# Patient Record
Sex: FEMALE | Race: WHITE | NOT HISPANIC OR LATINO | Employment: OTHER | URBAN - METROPOLITAN AREA
[De-identification: names, ages, dates, MRNs, and addresses within clinical notes are randomized per-mention and may not be internally consistent; named-entity substitution may affect disease eponyms.]

---

## 2017-06-12 ENCOUNTER — CONVERSION ENCOUNTER (OUTPATIENT)
Dept: MAMMOGRAPHY | Facility: CLINIC | Age: 82
End: 2017-06-12

## 2018-09-11 DIAGNOSIS — E03.9 HYPOTHYROIDISM, UNSPECIFIED TYPE: Primary | ICD-10-CM

## 2018-09-11 RX ORDER — LEVOTHYROXINE SODIUM 0.05 MG/1
TABLET ORAL
COMMUNITY
Start: 2017-08-14 | End: 2018-09-11 | Stop reason: SDUPTHER

## 2018-09-11 RX ORDER — LEVOTHYROXINE SODIUM 0.05 MG/1
50 TABLET ORAL DAILY
Qty: 90 TABLET | Refills: 2 | Status: SHIPPED | OUTPATIENT
Start: 2018-09-11 | End: 2018-12-03 | Stop reason: SDUPTHER

## 2018-09-11 RX ORDER — CLOPIDOGREL BISULFATE 75 MG/1
TABLET ORAL EVERY 24 HOURS
COMMUNITY
Start: 2017-09-11 | End: 2018-12-03 | Stop reason: SDUPTHER

## 2018-09-11 RX ORDER — RANITIDINE 150 MG/1
TABLET ORAL EVERY 24 HOURS
COMMUNITY
Start: 2017-08-14 | End: 2018-12-03 | Stop reason: SDUPTHER

## 2018-09-11 RX ORDER — DILTIAZEM HYDROCHLORIDE 240 MG/1
CAPSULE, COATED, EXTENDED RELEASE ORAL
COMMUNITY
Start: 2017-08-14 | End: 2018-12-03 | Stop reason: SDUPTHER

## 2018-11-19 DIAGNOSIS — N18.30 CKD (CHRONIC KIDNEY DISEASE), STAGE III (HCC): ICD-10-CM

## 2018-11-19 DIAGNOSIS — R80.9 PROTEINURIA, UNSPECIFIED TYPE: Primary | ICD-10-CM

## 2018-11-30 ENCOUNTER — TELEPHONE (OUTPATIENT)
Dept: FAMILY MEDICINE CLINIC | Facility: CLINIC | Age: 83
End: 2018-11-30

## 2018-11-30 RX ORDER — LOSARTAN POTASSIUM 50 MG/1
TABLET ORAL EVERY 24 HOURS
COMMUNITY
Start: 2017-11-29 | End: 2018-12-03 | Stop reason: SDUPTHER

## 2018-11-30 RX ORDER — ISOSORBIDE MONONITRATE 30 MG/1
TABLET, EXTENDED RELEASE ORAL
COMMUNITY
Start: 2017-08-18 | End: 2018-12-03 | Stop reason: SDUPTHER

## 2018-11-30 RX ORDER — CHOLECALCIFEROL (VITAMIN D3) 125 MCG
CAPSULE ORAL
COMMUNITY
Start: 2014-04-17 | End: 2018-12-03 | Stop reason: SDUPTHER

## 2018-12-03 ENCOUNTER — OFFICE VISIT (OUTPATIENT)
Dept: FAMILY MEDICINE CLINIC | Facility: CLINIC | Age: 83
End: 2018-12-03
Payer: MEDICARE

## 2018-12-03 VITALS
OXYGEN SATURATION: 98 % | SYSTOLIC BLOOD PRESSURE: 128 MMHG | HEART RATE: 53 BPM | DIASTOLIC BLOOD PRESSURE: 80 MMHG | TEMPERATURE: 95.2 F | WEIGHT: 142 LBS | RESPIRATION RATE: 15 BRPM

## 2018-12-03 DIAGNOSIS — K21.9 GASTROESOPHAGEAL REFLUX DISEASE WITHOUT ESOPHAGITIS: Primary | ICD-10-CM

## 2018-12-03 DIAGNOSIS — E55.9 VITAMIN D DEFICIENCY: ICD-10-CM

## 2018-12-03 DIAGNOSIS — I10 ESSENTIAL HYPERTENSION: ICD-10-CM

## 2018-12-03 DIAGNOSIS — E03.9 HYPOTHYROIDISM, UNSPECIFIED TYPE: ICD-10-CM

## 2018-12-03 DIAGNOSIS — I25.10 CORONARY ARTERY DISEASE INVOLVING NATIVE CORONARY ARTERY OF NATIVE HEART WITHOUT ANGINA PECTORIS: ICD-10-CM

## 2018-12-03 DIAGNOSIS — Z12.11 SCREEN FOR COLON CANCER: ICD-10-CM

## 2018-12-03 PROCEDURE — 99214 OFFICE O/P EST MOD 30 MIN: CPT | Performed by: SPECIALIST

## 2018-12-03 RX ORDER — LEVOTHYROXINE SODIUM 0.05 MG/1
50 TABLET ORAL DAILY
Qty: 90 TABLET | Refills: 4 | Status: SHIPPED | OUTPATIENT
Start: 2018-12-03 | End: 2019-06-19 | Stop reason: SDUPTHER

## 2018-12-03 RX ORDER — DILTIAZEM HYDROCHLORIDE 240 MG/1
240 CAPSULE, COATED, EXTENDED RELEASE ORAL DAILY
Qty: 90 CAPSULE | Refills: 4 | Status: SHIPPED | OUTPATIENT
Start: 2018-12-03 | End: 2019-06-19 | Stop reason: SDUPTHER

## 2018-12-03 RX ORDER — LOSARTAN POTASSIUM 50 MG/1
50 TABLET ORAL EVERY 24 HOURS
Qty: 90 TABLET | Refills: 4 | Status: SHIPPED | OUTPATIENT
Start: 2018-12-03 | End: 2019-06-19 | Stop reason: SDUPTHER

## 2018-12-03 RX ORDER — ISOSORBIDE MONONITRATE 30 MG/1
30 TABLET, EXTENDED RELEASE ORAL DAILY
Qty: 90 TABLET | Refills: 4 | Status: SHIPPED | OUTPATIENT
Start: 2018-12-03 | End: 2019-06-19 | Stop reason: SDUPTHER

## 2018-12-03 RX ORDER — CHOLECALCIFEROL (VITAMIN D3) 125 MCG
2000 CAPSULE ORAL DAILY
Qty: 90 TABLET | Refills: 4 | Status: SHIPPED | OUTPATIENT
Start: 2018-12-03 | End: 2020-08-19 | Stop reason: SDUPTHER

## 2018-12-03 RX ORDER — RANITIDINE 150 MG/1
150 TABLET ORAL DAILY PRN
Qty: 90 TABLET | Refills: 4 | Status: SHIPPED | OUTPATIENT
Start: 2018-12-03 | End: 2019-06-19 | Stop reason: SDUPTHER

## 2018-12-03 RX ORDER — CLOPIDOGREL BISULFATE 75 MG/1
75 TABLET ORAL DAILY
Qty: 90 TABLET | Refills: 4 | Status: SHIPPED | OUTPATIENT
Start: 2018-12-03 | End: 2019-06-19 | Stop reason: SDUPTHER

## 2018-12-03 NOTE — PROGRESS NOTES
Assessment and Plan:  Problem List Items Addressed This Visit     None        Health Maintenance Due   Topic Date Due    Depression Screening PHQ  03/31/1928    DTaP,Tdap,and Td Vaccines (1 - Tdap) 03/31/1949    Fall Risk  03/31/1993    Urinary Incontinence Screening  03/31/1993    Pneumococcal PPSV23/PCV13 65+ Years / Low and Medium Risk (1 of 2 - PCV13) 03/31/1993    INFLUENZA VACCINE  07/01/2018         HPI:  There is no problem list on file for this patient  Past Medical History:   Diagnosis Date    Appendicitis     CAD (coronary artery disease)     Colon polyps     Diverticulosis      Past Surgical History:   Procedure Laterality Date    APPENDECTOMY      CARDIAC CATHETERIZATION  03/2009    CATARACT EXTRACTION      COLONOSCOPY W/ POLYPECTOMY  10/2012    HYSTERECTOMY      TONSILLECTOMY       History reviewed  No pertinent family history  History   Smoking Status    Never Smoker   Smokeless Tobacco    Never Used     History   Alcohol Use    Yes     Comment: Occasional      History   Drug Use No         Current Outpatient Prescriptions   Medication Sig Dispense Refill    aspirin (ASPIRIN LOW DOSE) 81 MG tablet every 24 hours      Cholecalciferol (VITAMIN D3) 2000 units TABS take 1 Capsule by Oral route once      clopidogrel (PLAVIX) 75 mg tablet every 24 hours      diltiazem (CARTIA XT) 240 mg 24 hr capsule take 1 capsule by oral route 2 times every day   isosorbide mononitrate (IMDUR) 30 mg 24 hr tablet TAKE 1 TABLET (30MG) BY ORAL ROUTE EVERY DAY IN THE MORNING      levothyroxine 50 mcg tablet Take 1 tablet (50 mcg total) by mouth daily 90 tablet 2    losartan (COZAAR) 50 mg tablet every 24 hours      ranitidine (ZANTAC) 150 mg tablet every 24 hours       No current facility-administered medications for this visit        Allergies   Allergen Reactions    Amlodipine     Carvedilol     Digoxin     Losartan Edema    Nifedipine      Other reaction(s): Nausea and/or vomiting    Olmesartan     Other      Other reaction(s): Nausea and/or vomiting  CALCIUM CHANNEL BLOCKING AGENT    Rosuvastatin      Other reaction(s): Other (See Comments)  nausea    Sulfa Antibiotics        There is no immunization history on file for this patient  Patient Care Team:  Rowan Márquez MD as PCP - General (Internal Medicine)    Medicare Screening Tests and Risk Assessments:  Noelle Vivas is here for her Initial Wellness visit  Health Risk Assessment:  Patient rates overall health as excellent  Patient feels that their physical health rating is Same  Eyesight was rated as Same  Hearing was rated as Same  Patient feels that their emotional and mental health rating is Same  Pain experienced by patient in the last 7 days has been None  Patient states that she has experienced no weight loss or gain in last 6 months  Emotional/Mental Health:  Patient has been feeling nervous/anxious  PHQ-9 Depression Screening:    Frequency of the following problems over the past two weeks:      1  Little interest or pleasure in doing things: 0 - not at all      2  Feeling down, depressed, or hopeless: 0 - not at all  PHQ-2 Score: 0          Broken Bones/Falls: Fall Risk Assessment:    In the past year, patient has experienced: History of falling in past year     Number of falls: 1  Patient does not feel she is unsteady standing  Patient is not taking medication that can cause feelings of lightheadedness or tiredness  Patient often has a need to rush to the toilet  Chronic conditions that may contribute to falls visual disturbance  Bladder/Bowel:  Patient has leaked urine accidently in the last six months  Patient reports no loss of bowel control  Immunizations:  Patient has not had a flu vaccination within the last year  Patient has received a pneumonia shot  Patient has not received a shingles shot  Patient has not received tetanus/diphtheria shot       Home Safety:  Patient does not have trouble with stairs inside or outside of their home  Patient currently reports that there are safety hazards present in home , working smoke alarms, working carbon monoxide detectors  Preventative Screenings:   No breast cancer screening performed, no colon cancer screen completed, cholesterol screen completed, glaucoma eye exam completed,     Nutrition:  Current diet: Regular, Low Cholesterol, Low Saturated Fat, Low Carb and No Added Salt with servings of the following:    Medications:  Patient is currently taking over-the-counter supplements  Patient is able to manage medications  Lifestyle Choices:  Patient reports no tobacco use  Patient has not smoked or used tobacco in the past   Patient reports alcohol use  Alcohol use per week: once a year  Patient drives a vehicle  Patient wears seat belt  Activities of Daily Living:  Can get out of bed by his or her self, able to dress self, able to make own meals, able to do own shopping, able to bathe self, can do own laundry/housekeeping, can manage own money, pay bills and track expenses    Previous Hospitalizations:  No hospitalization or ED visit in past 12 months        Advanced Directives:  Patient has decided on a power of   Patient has spoken to designated power of   Patient has completed advanced directive  No exam data present    Physical Exam:  Review of Systems   Eyes: Positive for visual disturbance  Gastrointestinal: Negative for bowel incontinence  Psychiatric/Behavioral: The patient is not nervous/anxious  Vitals:    12/03/18 0956   BP: 128/80   BP Location: Left arm   Patient Position: Sitting   Cuff Size: Adult   Pulse: (!) 53   Resp: 15   Temp: (!) 95 2 °F (35 1 °C)   TempSrc: Tympanic   SpO2: 98%   Weight: 64 4 kg (142 lb)   There is no height or weight on file to calculate BMI      Physical Exam

## 2018-12-03 NOTE — PROGRESS NOTES
Assessment/Plan    81 Yo  Female  No dyspnea  No  Angina   Feels  Well    Does  Not  Take  Flu vaccine         Diagnoses and all orders for this visit:    Gastroesophageal reflux disease without esophagitis  -     ranitidine (ZANTAC) 150 mg tablet; Take 1 tablet (150 mg total) by mouth daily as needed for heartburn  -     diltiazem (CARTIA XT) 240 mg 24 hr capsule; Take 1 capsule (240 mg total) by mouth daily  -     CBC and differential; Future  -     Comprehensive metabolic panel; Future  -     Lipid panel; Future  -     Magnesium; Future  -     NT-BNP PRO; Future  -     TSH, 3rd generation; Future  -     T4, free; Future  -     UA w Reflex to Microscopic w Reflex to Culture -Lab Collect    Hypothyroidism, unspecified type  -     levothyroxine 50 mcg tablet; Take 1 tablet (50 mcg total) by mouth daily  -     ranitidine (ZANTAC) 150 mg tablet; Take 1 tablet (150 mg total) by mouth daily as needed for heartburn  -     diltiazem (CARTIA XT) 240 mg 24 hr capsule; Take 1 capsule (240 mg total) by mouth daily  -     CBC and differential; Future  -     Comprehensive metabolic panel; Future  -     Lipid panel; Future  -     Magnesium; Future  -     NT-BNP PRO; Future  -     TSH, 3rd generation; Future  -     T4, free; Future  -     UA w Reflex to Microscopic w Reflex to Culture -Lab Collect    Essential hypertension  -     losartan (COZAAR) 50 mg tablet; Take 1 tablet (50 mg total) by mouth every 24 hours  -     diltiazem (CARTIA XT) 240 mg 24 hr capsule; Take 1 capsule (240 mg total) by mouth daily  -     CBC and differential; Future  -     Comprehensive metabolic panel; Future  -     Lipid panel; Future  -     Magnesium; Future  -     NT-BNP PRO; Future  -     TSH, 3rd generation; Future  -     T4, free;  Future  -     UA w Reflex to Microscopic w Reflex to Culture -Lab Collect    Coronary artery disease involving native coronary artery of native heart without angina pectoris  -     isosorbide mononitrate (IMDUR) 30 mg 24 hr tablet; Take 1 tablet (30 mg total) by mouth daily  -     diltiazem (CARTIA XT) 240 mg 24 hr capsule; Take 1 capsule (240 mg total) by mouth daily  -     aspirin (ASPIRIN LOW DOSE) 81 MG tablet; Take 1 tablet (81 mg total) by mouth daily  -     clopidogrel (PLAVIX) 75 mg tablet; Take 1 tablet (75 mg total) by mouth daily  -     CBC and differential; Future  -     Comprehensive metabolic panel; Future  -     Lipid panel; Future  -     Magnesium; Future  -     NT-BNP PRO; Future  -     TSH, 3rd generation; Future  -     T4, free; Future  -     UA w Reflex to Microscopic w Reflex to Culture -Lab Collect    Vitamin D deficiency  -     Cholecalciferol (VITAMIN D3) 2000 units TABS; Take 1 tablet (2,000 Units total) by mouth daily  -     CBC and differential; Future  -     Comprehensive metabolic panel; Future  -     Lipid panel; Future  -     Magnesium; Future  -     NT-BNP PRO; Future  -     TSH, 3rd generation; Future  -     T4, free; Future  -     UA w Reflex to Microscopic w Reflex to Culture -Lab Collect    Other orders  -     Discontinue: isosorbide mononitrate (IMDUR) 30 mg 24 hr tablet; TAKE 1 TABLET (30MG) BY ORAL ROUTE EVERY DAY IN THE MORNING  -     Discontinue: losartan (COZAAR) 50 mg tablet; every 24 hours  -     Discontinue: Cholecalciferol (VITAMIN D3) 2000 units TABS; take 1 Capsule by Oral route once  -     Discontinue: aspirin (ASPIRIN LOW DOSE) 81 MG tablet; every 24 hours          Subjective:      Patient ID: Benjamín Puga is a 80 y o  female  HPI    The following portions of the patient's history were reviewed and updated as appropriate: allergies, current medications, past family history, past medical history, past social history, past surgical history and problem list     Review of Systems   Constitutional: Positive for fatigue  Negative for activity change, appetite change, chills, diaphoresis and fever  HENT: Negative for sinus pain, sinus pressure and voice change      Eyes: Negative for visual disturbance  Respiratory: Negative for cough, chest tightness, shortness of breath and wheezing  Cardiovascular: Negative for chest pain, palpitations and leg swelling  Gastrointestinal: Negative for abdominal distention, abdominal pain, anal bleeding and blood in stool  Genitourinary: Negative for difficulty urinating  Musculoskeletal: Positive for arthralgias  Negative for back pain, gait problem, joint swelling, myalgias and neck pain  Skin: Negative for color change, pallor, rash and wound  Neurological: Negative for dizziness, tremors, seizures, syncope, facial asymmetry, speech difficulty, weakness, light-headedness, numbness and headaches  Psychiatric/Behavioral: Negative for agitation and behavioral problems  Objective:      /80 (BP Location: Left arm, Patient Position: Sitting, Cuff Size: Adult)   Pulse (!) 53   Temp (!) 95 2 °F (35 1 °C) (Tympanic)   Resp 15   Wt 64 4 kg (142 lb)   SpO2 98%          Physical Exam   Constitutional: She is oriented to person, place, and time  She appears well-developed and well-nourished  No distress  HENT:   Head: Normocephalic and atraumatic  Right Ear: External ear normal    Mouth/Throat: Oropharynx is clear and moist  No oropharyngeal exudate  Has own  teeth   Eyes: Pupils are equal, round, and reactive to light  Conjunctivae are normal  Right eye exhibits no discharge  Left eye exhibits no discharge  No scleral icterus  Neck: No JVD present  Cardiovascular: Normal rate, regular rhythm and intact distal pulses  Exam reveals no gallop and no friction rub  No murmur heard  Pulmonary/Chest: Effort normal and breath sounds normal  She has no wheezes  She has no rales  Abdominal: Soft  Bowel sounds are normal  She exhibits no mass  There is no tenderness  Genitourinary:   Genitourinary Comments: Breasts  No  masses   Musculoskeletal: She exhibits no edema, tenderness or deformity     Neurological: She is alert and oriented to person, place, and time  Coordination normal    Skin: Skin is warm and dry  No rash noted  She is not diaphoretic  No erythema  No pallor  Psychiatric: She has a normal mood and affect   Her behavior is normal

## 2018-12-12 ENCOUNTER — TELEPHONE (OUTPATIENT)
Dept: FAMILY MEDICINE CLINIC | Facility: CLINIC | Age: 83
End: 2018-12-12

## 2018-12-12 NOTE — TELEPHONE ENCOUNTER
[facility] called and stated that the patient has a question in regards to her medication Losartan    Please call the pharmacy to advise [phone]

## 2018-12-13 NOTE — TELEPHONE ENCOUNTER
Dr Riya Ortiz cardiologist told patient not to take losartan medication   Patient would like to know if Dr Zunilda Villanueva is ok with this

## 2019-02-19 ENCOUNTER — OFFICE VISIT (OUTPATIENT)
Dept: FAMILY MEDICINE CLINIC | Facility: CLINIC | Age: 84
End: 2019-02-19
Payer: MEDICARE

## 2019-02-19 VITALS
OXYGEN SATURATION: 97 % | DIASTOLIC BLOOD PRESSURE: 80 MMHG | HEART RATE: 71 BPM | SYSTOLIC BLOOD PRESSURE: 132 MMHG | BODY MASS INDEX: 27.09 KG/M2 | HEIGHT: 62 IN | TEMPERATURE: 97.3 F | WEIGHT: 147.2 LBS

## 2019-02-19 DIAGNOSIS — I77.9 BILATERAL CAROTID ARTERY DISEASE, UNSPECIFIED TYPE (HCC): ICD-10-CM

## 2019-02-19 DIAGNOSIS — I48.0 PAROXYSMAL ATRIAL FIBRILLATION (HCC): ICD-10-CM
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
## 2019-02-19 PROCEDURE — 99213 OFFICE O/P EST LOW 20 MIN: CPT | Performed by: SPECIALIST

## 2019-02-19 RX ORDER — LIDOCAINE 50 MG/G
PATCH TOPICAL
Refills: 0 | COMMUNITY
Start: 2019-02-17

## 2019-02-19 NOTE — PROGRESS NOTES
Assessment/Plan:    Pt  Fell  On the  [address]  C  Aster  The  Paper      2/16/19   Seen  Er   At  [address], [address]  Reviewed       Old  t9   End  Plate   Compression   Fracture            There are no diagnoses linked to this encounter  Subjective:      Patient ID: Marilee Song is a 80 y o  female  79 yo  Female   Shayy Power     2/16/19    injured   Right  post   Chest  And  Ribs  And   Bumped  Head      Ct  No  New   Fractures         The following portions of the patient's history were reviewed and updated as appropriate: allergies, current medications, past family history, past medical history, past social history, past surgical history and problem list     Review of Systems   Constitutional: Positive for activity change  Negative for appetite change, chills, diaphoresis, fatigue and fever  HENT: Negative for congestion, sinus pressure, sinus pain and voice change  Eyes: Negative for visual disturbance  Respiratory: Negative for cough, chest tightness, shortness of breath and wheezing  Cardiovascular: Positive for chest pain  Negative for leg swelling  Right  Post  Rib  Pain     Gastrointestinal: Negative for abdominal distention and abdominal pain  Genitourinary: Negative for difficulty urinating and dysuria  Musculoskeletal: Negative for arthralgias, back pain, gait problem, joint swelling and myalgias  Skin: Negative for color change, pallor, rash and wound  Neurological: Negative for dizziness, tremors, seizures, syncope, facial asymmetry, speech difficulty, light-headedness, numbness and headaches  Psychiatric/Behavioral: Negative for agitation and behavioral problems  Objective:      /80 (BP Location: Left arm, Patient Position: Sitting, Cuff Size: Standard)   Pulse 71   Temp (!) 97 3 °F (36 3 °C) (Tympanic)   Ht 5' 2" (1 575 m)   Wt 66 8 kg (147 lb 3 2 oz)   SpO2 97%   BMI 26 92 kg/m²          Physical Exam   Constitutional: No distress     Eyes: Pupils are equal, round, and reactive to light  EOM are normal    Neck: No JVD present  Cardiovascular: Normal rate, regular rhythm and normal heart sounds  No murmur heard  Pulmonary/Chest: Effort normal and breath sounds normal  No respiratory distress  She has no wheezes  She has no rales  She exhibits no tenderness  Has  Lidocaine  Patch  Right post  Chest      There  Is  Also  Skin  abrasion   Musculoskeletal: She exhibits no edema or deformity  Skin: Skin is warm and dry  No rash noted  She is not diaphoretic  No erythema  No pallor  Psychiatric: She has a normal mood and affect   Her behavior is normal

## 2019-06-03 ENCOUNTER — OFFICE VISIT (OUTPATIENT)
Dept: FAMILY MEDICINE CLINIC | Facility: CLINIC | Age: 84
End: 2019-06-03
Payer: MEDICARE

## 2019-06-03 VITALS
DIASTOLIC BLOOD PRESSURE: 80 MMHG | OXYGEN SATURATION: 99 % | SYSTOLIC BLOOD PRESSURE: 140 MMHG | HEIGHT: 61 IN | WEIGHT: 146 LBS | HEART RATE: 52 BPM | BODY MASS INDEX: 27.56 KG/M2 | TEMPERATURE: 97.8 F

## 2019-06-03 DIAGNOSIS — Z53.29 NO-SHOW FOR APPOINTMENT: Primary | ICD-10-CM

## 2019-06-03 PROCEDURE — 99211 OFF/OP EST MAY X REQ PHY/QHP: CPT | Performed by: SPECIALIST

## 2019-06-19 ENCOUNTER — OFFICE VISIT (OUTPATIENT)
Dept: FAMILY MEDICINE CLINIC | Facility: CLINIC | Age: 84
End: 2019-06-19
Payer: MEDICARE

## 2019-06-19 VITALS
TEMPERATURE: 97.3 F | HEIGHT: 61 IN | SYSTOLIC BLOOD PRESSURE: 140 MMHG | OXYGEN SATURATION: 97 % | BODY MASS INDEX: 27.75 KG/M2 | RESPIRATION RATE: 16 BRPM | HEART RATE: 67 BPM | DIASTOLIC BLOOD PRESSURE: 70 MMHG | WEIGHT: 147 LBS

## 2019-06-19 DIAGNOSIS — Z00.00 MEDICARE ANNUAL WELLNESS VISIT, SUBSEQUENT: Primary | ICD-10-CM

## 2019-06-19 DIAGNOSIS — K21.9 GASTROESOPHAGEAL REFLUX DISEASE WITHOUT ESOPHAGITIS: ICD-10-CM

## 2019-06-19 DIAGNOSIS — I10 ESSENTIAL HYPERTENSION: ICD-10-CM

## 2019-06-19 DIAGNOSIS — E03.9 HYPOTHYROIDISM, UNSPECIFIED TYPE: ICD-10-CM

## 2019-06-19 DIAGNOSIS — I25.10 CORONARY ARTERY DISEASE INVOLVING NATIVE CORONARY ARTERY OF NATIVE HEART WITHOUT ANGINA PECTORIS: ICD-10-CM

## 2019-06-19 DIAGNOSIS — Z12.31 ENCOUNTER FOR SCREENING MAMMOGRAM FOR MALIGNANT NEOPLASM OF BREAST: ICD-10-CM

## 2019-06-19 PROCEDURE — G0439 PPPS, SUBSEQ VISIT: HCPCS | Performed by: SPECIALIST

## 2019-06-19 PROCEDURE — 99214 OFFICE O/P EST MOD 30 MIN: CPT | Performed by: SPECIALIST

## 2019-06-19 RX ORDER — DILTIAZEM HYDROCHLORIDE 240 MG/1
240 CAPSULE, COATED, EXTENDED RELEASE ORAL DAILY
Qty: 90 CAPSULE | Refills: 4 | Status: SHIPPED | OUTPATIENT
Start: 2019-06-19 | End: 2020-03-30 | Stop reason: SDUPTHER

## 2019-06-19 RX ORDER — ISOSORBIDE MONONITRATE 30 MG/1
30 TABLET, EXTENDED RELEASE ORAL DAILY
Qty: 90 TABLET | Refills: 4 | Status: SHIPPED | OUTPATIENT
Start: 2019-06-19 | End: 2020-08-04

## 2019-06-19 RX ORDER — CLOPIDOGREL BISULFATE 75 MG/1
75 TABLET ORAL DAILY
Qty: 90 TABLET | Refills: 4 | Status: SHIPPED | OUTPATIENT
Start: 2019-06-19 | End: 2020-08-04

## 2019-06-19 RX ORDER — LEVOTHYROXINE SODIUM 0.05 MG/1
50 TABLET ORAL DAILY
Qty: 90 TABLET | Refills: 4 | Status: SHIPPED | OUTPATIENT
Start: 2019-06-19 | End: 2019-07-22 | Stop reason: SDUPTHER

## 2019-06-19 RX ORDER — RANITIDINE 150 MG/1
150 TABLET ORAL DAILY PRN
Qty: 90 TABLET | Refills: 4 | Status: SHIPPED | OUTPATIENT
Start: 2019-06-19

## 2019-06-19 RX ORDER — LOSARTAN POTASSIUM 50 MG/1
50 TABLET ORAL EVERY 24 HOURS
Qty: 90 TABLET | Refills: 4 | Status: SHIPPED | OUTPATIENT
Start: 2019-06-19 | End: 2020-08-19 | Stop reason: SDUPTHER

## 2019-07-22 DIAGNOSIS — Z12.31 ENCOUNTER FOR SCREENING MAMMOGRAM FOR MALIGNANT NEOPLASM OF BREAST: ICD-10-CM

## 2019-07-22 DIAGNOSIS — E03.9 HYPOTHYROIDISM, UNSPECIFIED TYPE: ICD-10-CM

## 2019-07-22 DIAGNOSIS — K21.9 GASTROESOPHAGEAL REFLUX DISEASE WITHOUT ESOPHAGITIS: ICD-10-CM

## 2019-07-22 DIAGNOSIS — I10 ESSENTIAL HYPERTENSION: ICD-10-CM

## 2019-07-22 DIAGNOSIS — Z00.00 MEDICARE ANNUAL WELLNESS VISIT, SUBSEQUENT: ICD-10-CM

## 2019-07-22 DIAGNOSIS — I25.10 CORONARY ARTERY DISEASE INVOLVING NATIVE CORONARY ARTERY OF NATIVE HEART WITHOUT ANGINA PECTORIS: ICD-10-CM

## 2019-07-22 RX ORDER — LEVOTHYROXINE SODIUM 0.05 MG/1
50 TABLET ORAL DAILY
Qty: 90 TABLET | Refills: 1 | Status: SHIPPED | OUTPATIENT
Start: 2019-07-22 | End: 2019-07-26 | Stop reason: SDUPTHER

## 2019-07-26 ENCOUNTER — TELEPHONE (OUTPATIENT)
Dept: FAMILY MEDICINE CLINIC | Facility: CLINIC | Age: 84
End: 2019-07-26

## 2019-07-26 DIAGNOSIS — E03.9 HYPOTHYROIDISM, UNSPECIFIED TYPE: ICD-10-CM

## 2019-07-26 DIAGNOSIS — Z12.31 ENCOUNTER FOR SCREENING MAMMOGRAM FOR MALIGNANT NEOPLASM OF BREAST: ICD-10-CM

## 2019-07-26 DIAGNOSIS — Z00.00 MEDICARE ANNUAL WELLNESS VISIT, SUBSEQUENT: ICD-10-CM

## 2019-07-26 DIAGNOSIS — I25.10 CORONARY ARTERY DISEASE INVOLVING NATIVE CORONARY ARTERY OF NATIVE HEART WITHOUT ANGINA PECTORIS: ICD-10-CM

## 2019-07-26 DIAGNOSIS — I10 ESSENTIAL HYPERTENSION: ICD-10-CM

## 2019-07-26 DIAGNOSIS — K21.9 GASTROESOPHAGEAL REFLUX DISEASE WITHOUT ESOPHAGITIS: ICD-10-CM

## 2019-07-29 RX ORDER — LEVOTHYROXINE SODIUM 0.05 MG/1
TABLET ORAL
Qty: 90 TABLET | Refills: 0 | Status: SHIPPED | OUTPATIENT
Start: 2019-07-29 | End: 2020-02-18 | Stop reason: SDUPTHER

## 2019-09-18 ENCOUNTER — TRANSITIONAL CARE MANAGEMENT (OUTPATIENT)
Dept: FAMILY MEDICINE CLINIC | Facility: CLINIC | Age: 84
End: 2019-09-18

## 2019-09-18 ENCOUNTER — OFFICE VISIT (OUTPATIENT)
Dept: FAMILY MEDICINE CLINIC | Facility: CLINIC | Age: 84
End: 2019-09-18
Payer: MEDICARE

## 2019-09-18 VITALS
HEART RATE: 56 BPM | OXYGEN SATURATION: 97 % | BODY MASS INDEX: 28.83 KG/M2 | DIASTOLIC BLOOD PRESSURE: 72 MMHG | WEIGHT: 143 LBS | HEIGHT: 59 IN | TEMPERATURE: 97.4 F | SYSTOLIC BLOOD PRESSURE: 140 MMHG

## 2019-09-18 DIAGNOSIS — K92.2 GASTROINTESTINAL HEMORRHAGE, UNSPECIFIED GASTROINTESTINAL HEMORRHAGE TYPE: Primary | ICD-10-CM

## 2019-09-18 DIAGNOSIS — N18.30 CKD (CHRONIC KIDNEY DISEASE), STAGE III (HCC): ICD-10-CM

## 2019-09-18 PROCEDURE — 99496 TRANSJ CARE MGMT HIGH F2F 7D: CPT | Performed by: SPECIALIST

## 2019-09-18 NOTE — PROGRESS NOTES
Assessment/Plan:     59-year-old female who was evaluated [facility] for abdominal pain dark blood in stools felt to be ischemic colitis      CT show an evidence of thickening of the colon     diagnosed ischemic colitis there was no endoscopies done    she is a patient of Dr Dianna Rees             Patient seen in office today  During the visit I was accompanied by MA while physical exam was completed  No problem-specific Assessment & Plan notes found for this encounter  Problem List Items Addressed This Visit     None      Visit Diagnoses     Gastrointestinal hemorrhage, unspecified gastrointestinal hemorrhage type    -  Primary    Relevant Orders    POCT ECG    CBC and differential    Ferritin    Iron    Iron Saturation %    Basic metabolic panel    CBC and differential    Basic metabolic panel    Occult Blood, Fecal Immunochemical    Occult Blood, Fecal Immunochemical    CKD (chronic kidney disease), stage III (HCC)        Relevant Orders    POCT ECG    CBC and differential    Ferritin    Iron    Iron Saturation %    Basic metabolic panel    CBC and differential    Basic metabolic panel            Subjective:     BMI Counseling: Body mass index is 28 88 kg/m²  Discussed the patient's BMI with her  The BMI     Depression Screening Follow-up Plan: Patient's depression screening was positive with a PHQ-2 score of   Their PHQ-9 score was   Clinically patient does not have depression  No treatment is required  Patient ID: Vaishnavi Weston is a 80 y o  female      59-year-old female who is a patient [facility] with abdominal pain dark stools with blood hemoglobin remained stable CT scan was done and consultation with Gastroenterology was felt on the bases a CT scan history she had ischemic colitis    she has upset history of paroxysmal atrial fibrillation and she has been on aspirin and Plavix from Cardiology which they continued on discharge she did see some blood in her stool as of 1 day ago but since then her abdominal pain and noted no further blood noted today     She goes to [facility] and her son will take her there for an appointment she needs close follow-up      Based on what I saw on the record I am not totally convinced this is ischemic colitis and not diverticular bleed from diverticulosis I think further studies could be done to look at her vasculature and also her lower colon await further recommendations from Gastroenterology [facility]    Presently stable I did order CBC BMP iron studies for 1 week and CBC and a BMP for 2 weeks patient will observe any further abdominal pain or bleeding and says if significant may need to return to the emergency room      The following portions of the patient's history were reviewed and updated as appropriate: allergies, past family history, past social history, past surgical history and problem list     Review of Systems   Constitutional: Negative for activity change, appetite change, chills, diaphoresis, fatigue and fever  HENT: Negative for congestion and voice change  Eyes: Negative for visual disturbance  Respiratory: Negative for cough, chest tightness, shortness of breath and wheezing  Cardiovascular: Negative for chest pain, palpitations and leg swelling  Gastrointestinal: Positive for abdominal pain and blood in stool  Negative for abdominal distention  Genitourinary: Negative for difficulty urinating  Musculoskeletal: Negative for arthralgias  Neurological: Negative for dizziness, tremors, seizures, facial asymmetry, speech difficulty, weakness, light-headedness, numbness and headaches  Psychiatric/Behavioral: Negative for agitation           Objective:      /72 (BP Location: Left arm, Patient Position: Sitting, Cuff Size: Standard)   Pulse 56   Temp (!) 97 4 °F (36 3 °C) (Tympanic)   Ht 4' 11" (1 499 m)   Wt 64 9 kg (143 lb)   SpO2 97%   BMI 28 88 kg/m²          Physical Exam   Constitutional: She appears well-developed and well-nourished  No distress  Eyes: No scleral icterus  Cardiovascular: Normal rate, regular rhythm and normal heart sounds  No murmur heard  Pulmonary/Chest: Effort normal  She has no wheezes  She has no rales  Abdominal: Soft  Normal appearance  She exhibits no distension, no pulsatile liver, no fluid wave, no ascites, no pulsatile midline mass and no mass  Slight tenderness left lower quadrant   Skin: Skin is warm and dry

## 2019-09-20 PROCEDURE — 93000 ELECTROCARDIOGRAM COMPLETE: CPT | Performed by: SPECIALIST

## 2019-09-25 ENCOUNTER — TELEPHONE (OUTPATIENT)
Dept: FAMILY MEDICINE CLINIC | Facility: CLINIC | Age: 84
End: 2019-09-25

## 2019-09-25 NOTE — TELEPHONE ENCOUNTER
Lab called and this patient did the stool test 2 different times  She does not follow the directions, and they verbally explained to her  They recommend she go to gastro, or see if we can preform test in the office      Thanks

## 2020-02-18 ENCOUNTER — OFFICE VISIT (OUTPATIENT)
Dept: FAMILY MEDICINE CLINIC | Facility: CLINIC | Age: 85
End: 2020-02-18
Payer: MEDICARE

## 2020-02-18 VITALS
WEIGHT: 147 LBS | SYSTOLIC BLOOD PRESSURE: 118 MMHG | BODY MASS INDEX: 29.64 KG/M2 | TEMPERATURE: 97.6 F | HEIGHT: 59 IN | HEART RATE: 57 BPM | DIASTOLIC BLOOD PRESSURE: 80 MMHG | OXYGEN SATURATION: 98 %

## 2020-02-18 DIAGNOSIS — K55.9 ISCHEMIC BOWEL DISEASE (HCC): ICD-10-CM

## 2020-02-18 DIAGNOSIS — Z12.39 SCREENING FOR BREAST CANCER: ICD-10-CM

## 2020-02-18 DIAGNOSIS — I25.10 CORONARY ARTERY DISEASE INVOLVING NATIVE CORONARY ARTERY OF NATIVE HEART WITHOUT ANGINA PECTORIS: ICD-10-CM

## 2020-02-18 DIAGNOSIS — N64.4 BREAST TENDERNESS: ICD-10-CM

## 2020-02-18 DIAGNOSIS — I10 ESSENTIAL HYPERTENSION: Primary | ICD-10-CM

## 2020-02-18 DIAGNOSIS — E55.9 VITAMIN D DEFICIENCY: ICD-10-CM

## 2020-02-18 DIAGNOSIS — I48.0 PAROXYSMAL ATRIAL FIBRILLATION (HCC): ICD-10-CM

## 2020-02-18 DIAGNOSIS — E03.9 HYPOTHYROIDISM, UNSPECIFIED TYPE: ICD-10-CM

## 2020-02-18 DIAGNOSIS — Z12.11 SCREEN FOR COLON CANCER: ICD-10-CM

## 2020-02-18 DIAGNOSIS — K21.9 GASTROESOPHAGEAL REFLUX DISEASE WITHOUT ESOPHAGITIS: ICD-10-CM

## 2020-02-18 PROCEDURE — 3008F BODY MASS INDEX DOCD: CPT | Performed by: SPECIALIST

## 2020-02-18 PROCEDURE — 1160F RVW MEDS BY RX/DR IN RCRD: CPT | Performed by: SPECIALIST

## 2020-02-18 PROCEDURE — 99214 OFFICE O/P EST MOD 30 MIN: CPT | Performed by: SPECIALIST

## 2020-02-18 PROCEDURE — 3079F DIAST BP 80-89 MM HG: CPT | Performed by: SPECIALIST

## 2020-02-18 PROCEDURE — 1036F TOBACCO NON-USER: CPT | Performed by: SPECIALIST

## 2020-02-18 PROCEDURE — 3074F SYST BP LT 130 MM HG: CPT | Performed by: SPECIALIST

## 2020-02-18 RX ORDER — LEVOTHYROXINE SODIUM 0.05 MG/1
50 TABLET ORAL DAILY
Qty: 90 TABLET | Refills: 1 | Status: SHIPPED | OUTPATIENT
Start: 2020-02-18 | End: 2020-08-04

## 2020-02-18 NOTE — PROGRESS NOTES
Assessment/Plan:  Patient was seen today for follow-up no acute complaints some low back pain at times treated with Tylenol with good relief no cardio or pulmonary symptoms are noted she can walk 4 blocks and 2 flights of steps without any problems on physical exam her left breast axillary area is full but no discrete mass in the right breast axillary area she is a little tender in the upper outer right breast she does go for mammogram cause she had history of benign breast masses    Patient seen in office today  During the visit I was accompanied by MA while physical exam was completed  No problem-specific Assessment & Plan notes found for this encounter           Problem List Items Addressed This Visit        Cardiovascular and Mediastinum    Paroxysmal atrial fibrillation (HCC)    Relevant Orders    CBC and differential    Comprehensive metabolic panel    Lipid panel    Magnesium    Occult Blood, Fecal Immunochemical    TSH, 3rd generation with Free T4 reflex    UA w Reflex to Microscopic w Reflex to Culture -Lab Collect    Vitamin D 25 hydroxy      Other Visit Diagnoses     Essential hypertension    -  Primary    Relevant Orders    CBC and differential    Comprehensive metabolic panel    Lipid panel    Magnesium    Occult Blood, Fecal Immunochemical    TSH, 3rd generation with Free T4 reflex    UA w Reflex to Microscopic w Reflex to Culture -Lab Collect    Vitamin D 25 hydroxy    Hypothyroidism, unspecified type        Relevant Medications    levothyroxine 50 mcg tablet    Other Relevant Orders    CBC and differential    Comprehensive metabolic panel    Lipid panel    Magnesium    Occult Blood, Fecal Immunochemical    TSH, 3rd generation with Free T4 reflex    UA w Reflex to Microscopic w Reflex to Culture -Lab Collect    Vitamin D 25 hydroxy    Ischemic bowel disease (HCC)        Relevant Orders    CBC and differential    Comprehensive metabolic panel    Lipid panel    Magnesium    Occult Blood, Fecal Immunochemical    TSH, 3rd generation with Free T4 reflex    UA w Reflex to Microscopic w Reflex to Culture -Lab Collect    Vitamin D 25 hydroxy    Coronary artery disease involving native coronary artery of native heart without angina pectoris        Relevant Orders    CBC and differential    Comprehensive metabolic panel    Lipid panel    Magnesium    Occult Blood, Fecal Immunochemical    TSH, 3rd generation with Free T4 reflex    UA w Reflex to Microscopic w Reflex to Culture -Lab Collect    Vitamin D 25 hydroxy    Gastroesophageal reflux disease without esophagitis        Relevant Orders    CBC and differential    Comprehensive metabolic panel    Lipid panel    Magnesium    Occult Blood, Fecal Immunochemical    TSH, 3rd generation with Free T4 reflex    UA w Reflex to Microscopic w Reflex to Culture -Lab Collect    Vitamin D 25 hydroxy    Vitamin D deficiency        Relevant Orders    CBC and differential    Comprehensive metabolic panel    Lipid panel    Magnesium    Occult Blood, Fecal Immunochemical    TSH, 3rd generation with Free T4 reflex    UA w Reflex to Microscopic w Reflex to Culture -Lab Collect    Vitamin D 25 hydroxy    Screen for colon cancer        Relevant Orders    CBC and differential    Comprehensive metabolic panel    Lipid panel    Magnesium    Occult Blood, Fecal Immunochemical    TSH, 3rd generation with Free T4 reflex    UA w Reflex to Microscopic w Reflex to Culture -Lab Collect    Vitamin D 25 hydroxy    Screening for breast cancer        Relevant Orders    Mammo screening bilateral w 3d & cad    Breast tenderness        Relevant Orders    Mammo screening bilateral w 3d & cad            Subjective:            Patient ID: Nikole Nash is a 80 y o  female      40-year-old female generally feels well offers no acute complaints no cardio or pulmonary symptoms some low back pain at times treated with Tylenol with good relief needs routine blood work done and check stools for blood and mammography   on exam she was tender in the right upper outer breast and both of those areas are somewhat full in both breasts but no discrete individual mass felt but there is fullness in the area and she has had benign breast masses in the past      The following portions of the patient's history were reviewed and updated as appropriate: allergies, past family history, past social history and past surgical history  Review of Systems   Constitutional: Negative for activity change, appetite change, chills, diaphoresis, fatigue and fever  HENT: Negative for congestion, sinus pressure, sinus pain and voice change  Eyes: Negative for visual disturbance  Respiratory: Negative for cough, chest tightness, shortness of breath and wheezing  Cardiovascular: Negative for chest pain, palpitations and leg swelling  Gastrointestinal: Negative for anal bleeding and blood in stool  Genitourinary: Negative for difficulty urinating and dysuria  Musculoskeletal: Negative for arthralgias, back pain, gait problem, joint swelling, myalgias and neck pain  Skin: Negative  Neurological: Negative for dizziness, tremors, seizures, syncope, facial asymmetry, speech difficulty, light-headedness, numbness and headaches  Psychiatric/Behavioral: Negative for agitation and behavioral problems  Objective:      /80 (BP Location: Left arm, Patient Position: Sitting, Cuff Size: Standard)   Pulse 57   Temp 97 6 °F (36 4 °C) (Tympanic)   Ht 4' 11" (1 499 m)   Wt 66 7 kg (147 lb)   SpO2 98%   BMI 29 69 kg/m²          Physical Exam   Constitutional: She is oriented to person, place, and time  No distress  HENT:   Mouth/Throat: No oropharyngeal exudate  Eyes: Pupils are equal, round, and reactive to light  EOM are normal  No scleral icterus  Neck: No JVD present  No thyromegaly present  Cardiovascular: Normal rate, regular rhythm, normal heart sounds and intact distal pulses     No murmur heard   Pulmonary/Chest: Effort normal and breath sounds normal  She has no wheezes  Abdominal: Soft  Bowel sounds are normal  She exhibits no mass  Genitourinary:   Genitourinary Comments: Left upper outer breast is full the right upper outer breast is tender mammography ordered   Musculoskeletal: Normal range of motion  She exhibits no edema  Neurological: She is alert and oriented to person, place, and time  Skin: Skin is warm and dry  She is not diaphoretic  Psychiatric: She has a normal mood and affect

## 2020-03-27 DIAGNOSIS — K21.9 GASTROESOPHAGEAL REFLUX DISEASE WITHOUT ESOPHAGITIS: ICD-10-CM

## 2020-03-27 DIAGNOSIS — Z12.31 ENCOUNTER FOR SCREENING MAMMOGRAM FOR MALIGNANT NEOPLASM OF BREAST: ICD-10-CM

## 2020-03-27 DIAGNOSIS — E03.9 HYPOTHYROIDISM, UNSPECIFIED TYPE: ICD-10-CM

## 2020-03-27 DIAGNOSIS — Z00.00 MEDICARE ANNUAL WELLNESS VISIT, SUBSEQUENT: ICD-10-CM

## 2020-03-27 DIAGNOSIS — I25.10 CORONARY ARTERY DISEASE INVOLVING NATIVE CORONARY ARTERY OF NATIVE HEART WITHOUT ANGINA PECTORIS: ICD-10-CM

## 2020-03-27 DIAGNOSIS — I10 ESSENTIAL HYPERTENSION: ICD-10-CM

## 2020-03-27 NOTE — TELEPHONE ENCOUNTER
Altaf is on back order  Pharmacy gave patient the generic and patient called and said she doesn't like the generic that she feels dizzy  Pharmacy asked if there is something else we can send in

## 2020-03-27 NOTE — TELEPHONE ENCOUNTER
As  Long  As  The  cardia  Is  Extended   Dosage    That is    Cardia    240    xt     This  Is  Diltiazem   The  Generic  May  Be   Dissolving  At  A  Different  Rate        These   Are  Capsules     Try  Taking  With  Food      If  This  Doesn't   Work   Can  Try   Cardia   xr  [address] [facility]

## 2020-03-30 RX ORDER — DILTIAZEM HYDROCHLORIDE 240 MG/1
240 CAPSULE, COATED, EXTENDED RELEASE ORAL DAILY
Qty: 90 CAPSULE | Refills: 4 | Status: SHIPPED | OUTPATIENT
Start: 2020-03-30 | End: 2020-08-19 | Stop reason: SDUPTHER

## 2020-08-03 DIAGNOSIS — I10 ESSENTIAL HYPERTENSION: ICD-10-CM

## 2020-08-03 DIAGNOSIS — Z12.31 ENCOUNTER FOR SCREENING MAMMOGRAM FOR MALIGNANT NEOPLASM OF BREAST: ICD-10-CM

## 2020-08-03 DIAGNOSIS — K21.9 GASTROESOPHAGEAL REFLUX DISEASE WITHOUT ESOPHAGITIS: ICD-10-CM

## 2020-08-03 DIAGNOSIS — Z00.00 MEDICARE ANNUAL WELLNESS VISIT, SUBSEQUENT: ICD-10-CM

## 2020-08-03 DIAGNOSIS — E03.9 HYPOTHYROIDISM, UNSPECIFIED TYPE: ICD-10-CM

## 2020-08-03 DIAGNOSIS — I25.10 CORONARY ARTERY DISEASE INVOLVING NATIVE CORONARY ARTERY OF NATIVE HEART WITHOUT ANGINA PECTORIS: ICD-10-CM

## 2020-08-04 RX ORDER — ISOSORBIDE MONONITRATE 30 MG/1
TABLET, EXTENDED RELEASE ORAL
Qty: 90 TABLET | Refills: 4 | Status: SHIPPED | OUTPATIENT
Start: 2020-08-04 | End: 2020-08-19 | Stop reason: SDUPTHER

## 2020-08-04 RX ORDER — CLOPIDOGREL BISULFATE 75 MG/1
TABLET ORAL
Qty: 90 TABLET | Refills: 4 | Status: SHIPPED | OUTPATIENT
Start: 2020-08-04 | End: 2020-08-19 | Stop reason: SDUPTHER

## 2020-08-04 RX ORDER — LEVOTHYROXINE SODIUM 0.05 MG/1
TABLET ORAL
Qty: 90 TABLET | Refills: 1 | Status: SHIPPED | OUTPATIENT
Start: 2020-08-04 | End: 2020-08-19 | Stop reason: SDUPTHER

## 2020-08-18 NOTE — PROGRESS NOTES
Assessment/Plan:      Diet reviewed  Lifestyle modifications reviewed  Medications reviewed and ordered  Laboratory tests and studies reviewed and ordered  All patient's questions answered to patient satisfaction  No chief complaint on file  Diagnoses and all orders for this visit:    Initial Medicare annual wellness visit    Coronary artery disease involving native coronary artery of native heart without angina pectoris    Essential hypertension    Hypothyroidism, unspecified type    Paroxysmal atrial fibrillation (HCC)        Subjective:     HPI     Patient ID: Luke Canchola is a 80 y o  female  Current Outpatient Medications:     aspirin (ASPIRIN LOW DOSE) 81 MG tablet, Take 1 tablet (81 mg total) by mouth daily, Disp: 100 tablet, Rfl: 4    Cholecalciferol (VITAMIN D3) 2000 units TABS, Take 1 tablet (2,000 Units total) by mouth daily, Disp: 90 tablet, Rfl: 4    clopidogrel (PLAVIX) 75 mg tablet, TAKE 1 TABLET(75 MG) BY MOUTH DAILY, Disp: 90 tablet, Rfl: 4    diltiazem (Cartia XT) 240 mg 24 hr capsule, Take 1 capsule (240 mg total) by mouth daily, Disp: 90 capsule, Rfl: 4    isosorbide mononitrate (IMDUR) 30 mg 24 hr tablet, TAKE 1 TABLET(30 MG) BY MOUTH DAILY, Disp: 90 tablet, Rfl: 4    levothyroxine 50 mcg tablet, TAKE 1 TABLET(50 MCG) BY MOUTH DAILY, Disp: 90 tablet, Rfl: 1    lidocaine (LIDODERM) 5 %, PLACE 1 PATCH ON THE SKIN DAILY FOR 7 DAYS   REMOVE AND DISCARD PATCH WITHIN 12 H, Disp: , Rfl: 0    losartan (COZAAR) 50 mg tablet, Take 1 tablet (50 mg total) by mouth every 24 hours, Disp: 90 tablet, Rfl: 4    ranitidine (ZANTAC) 150 mg tablet, Take 1 tablet (150 mg total) by mouth daily as needed for heartburn, Disp: 90 tablet, Rfl: 4    The following portions of the patient's history were reviewed and updated as appropriate: allergies, current medications, past family history, past medical history, past social history, past surgical history and problem list     Review of Systems Family History   Problem Relation Age of Onset    No Known Problems Mother     No Known Problems Father        Past Medical History:   Diagnosis Date    Appendicitis     CAD (coronary artery disease)     Colon polyps     Diverticulosis        Past Surgical History:   Procedure Laterality Date    APPENDECTOMY      CARDIAC CATHETERIZATION  03/2009    CATARACT EXTRACTION      COLONOSCOPY W/ POLYPECTOMY  10/2012    HYSTERECTOMY      TONSILLECTOMY         Social History     Socioeconomic History    Marital status:      Spouse name: Not on file    Number of children: Not on file    Years of education: Not on file    Highest education level: Not on file   Occupational History    Occupation: Retired   Social Needs    Financial resource strain: Not hard at all   [employer] insecurity     Worry: Never true     Inability: Never true   [employer] needs     Medical: No     Non-medical: No   Tobacco Use    Smoking status: Never Smoker    Smokeless tobacco: Never Used   Substance and Sexual Activity    Alcohol use: Yes     Comment: Occasional    Drug use: No    Sexual activity: Not Currently   Lifestyle    Physical activity     Days per week: 4 days     Minutes per session: 30 min    Stress: Not at all   Relationships    Social connections     Talks on phone: More than three times a week     Gets together: More than three times a week     Attends Christian service: 1 to 4 times per year     Active member of club or organization: No     Attends meetings of clubs or organizations: Never     Relationship status:      Intimate partner violence     Fear of current or ex partner: No     Emotionally abused: No     Physically abused: No     Forced sexual activity: No   Other Topics Concern    Not on file   Social History Narrative    Not on file       Allergies   Allergen Reactions    Amlodipine     Carvedilol     Digoxin     Losartan Edema    Nifedipine      Other reaction(s): Nausea and/or vomiting    Olmesartan     Other      Other reaction(s): Nausea and/or vomiting  CALCIUM CHANNEL BLOCKING AGENT    Rosuvastatin      Other reaction(s): Other (See Comments)  nausea    Sulfa Antibiotics             Objective: There were no vitals taken for this visit         Physical Exam

## 2020-08-18 NOTE — PROGRESS NOTES
Assessment and Plan:     Problem List Items Addressed This Visit        Active Problems    Paroxysmal atrial fibrillation ([address] )      Other Visit Diagnoses     Initial Medicare annual wellness visit    -  Primary    Coronary artery disease involving native coronary artery of native heart without angina pectoris        Essential hypertension        Hypothyroidism, unspecified type            BMI Counseling: There is no height or weight on file to calculate BMI  Follow-up plan was not completed due to elderly patient (72 years old) where weight reduction/weight gain would complicate underlying health condition such as: illness or physical disability, mental illness, dementia, or confusion and nutritional deficiency  Falls Plan of Care: balance, strength, and gait training instructions were provided  Preventive health issues were discussed with patient, and age appropriate screening tests were ordered as noted in patient's After Visit Summary  Personalized health advice and appropriate referrals for health education or preventive services given if needed, as noted in patient's After Visit Summary  History of Present Illness:     Patient presents for Welcome to Medicare visit       Patient Care Team:  Ramakrishna Lewis MD as PCP - General (Internal Medicine)     Review of Systems:     Review of Systems   Problem List:     Patient Active Problem List   Diagnosis    Bilateral carotid artery disease (HCC)    Paroxysmal atrial fibrillation [facility])      Past Medical and Surgical History:     Past Medical History:   Diagnosis Date    Appendicitis     CAD (coronary artery disease)     Colon polyps     Diverticulosis      Past Surgical History:   Procedure Laterality Date    APPENDECTOMY      CARDIAC CATHETERIZATION  03/2009    CATARACT EXTRACTION      COLONOSCOPY W/ POLYPECTOMY  10/2012    HYSTERECTOMY      TONSILLECTOMY        Family History:     Family History   Problem Relation Age of Onset    No Known Problems Mother     No Known Problems Father       Social History:     E-Cigarette/Vaping    E-Cigarette Use Never User      E-Cigarette/Vaping Substances    Nicotine No     THC No     CBD No     Flavoring No     Other No     Unknown No      Social History     Socioeconomic History    Marital status:      Spouse name: Not on file    Number of children: Not on file    Years of education: Not on file    Highest education level: Not on file   Occupational History    Occupation: Retired   Social Needs    Financial resource strain: Not hard at all   [employer] insecurity     Worry: Never true     Inability: Never true   [employer] needs     Medical: No     Non-medical: No   Tobacco Use    Smoking status: Never Smoker    Smokeless tobacco: Never Used   Substance and Sexual Activity    Alcohol use: Yes     Comment: Occasional    Drug use: No    Sexual activity: Not Currently   Lifestyle    Physical activity     Days per week: 4 days     Minutes per session: 30 min    Stress: Not at all   Relationships    Social connections     Talks on phone: More than three times a week     Gets together: More than three times a week     Attends Taoist service: 1 to 4 times per year     Active member of club or organization: No     Attends meetings of clubs or organizations: Never     Relationship status:      Intimate partner violence     Fear of current or ex partner: No     Emotionally abused: No     Physically abused: No     Forced sexual activity: No   Other Topics Concern    Not on file   Social History Narrative    Not on file      Medications and Allergies:     Current Outpatient Medications   Medication Sig Dispense Refill    aspirin (ASPIRIN LOW DOSE) 81 MG tablet Take 1 tablet (81 mg total) by mouth daily 100 tablet 4    Cholecalciferol (VITAMIN D3) 2000 units TABS Take 1 tablet (2,000 Units total) by mouth daily 90 tablet 4    clopidogrel (PLAVIX) 75 mg tablet TAKE 1 TABLET(75 MG) BY MOUTH DAILY 90 tablet 4    diltiazem (Cartia XT) 240 mg 24 hr capsule Take 1 capsule (240 mg total) by mouth daily 90 capsule 4    isosorbide mononitrate (IMDUR) 30 mg 24 hr tablet TAKE 1 TABLET(30 MG) BY MOUTH DAILY 90 tablet 4    levothyroxine 50 mcg tablet TAKE 1 TABLET(50 MCG) BY MOUTH DAILY 90 tablet 1    lidocaine (LIDODERM) 5 % PLACE 1 PATCH ON THE SKIN DAILY FOR 7 DAYS  REMOVE AND DISCARD PATCH WITHIN 12 H  0    losartan (COZAAR) 50 mg tablet Take 1 tablet (50 mg total) by mouth every 24 hours 90 tablet 4    ranitidine (ZANTAC) 150 mg tablet Take 1 tablet (150 mg total) by mouth daily as needed for heartburn 90 tablet 4     No current facility-administered medications for this visit  Allergies   Allergen Reactions    Amlodipine     Carvedilol     Digoxin     Losartan Edema    Nifedipine      Other reaction(s): Nausea and/or vomiting    Olmesartan     Other      Other reaction(s): Nausea and/or vomiting  CALCIUM CHANNEL BLOCKING AGENT    Rosuvastatin      Other reaction(s): Other (See Comments)  nausea    Sulfa Antibiotics       Immunizations: There is no immunization history on file for this patient  Health Maintenance: There are no preventive care reminders to display for this patient  Topic Date Due    DTaP,Tdap,and Td Vaccines (1 - Tdap) 03/31/1949    Pneumococcal Vaccine: 65+ Years (1 of 1 - PPSV23) 03/31/1993    Influenza Vaccine  07/01/2020      Medicare Screening Tests and Risk Assessments:     Mohinder Booker is here for her Initial Wellness visit  Last Medicare Wellness visit information reviewed, patient interviewed and updates made to the record today  Health Risk Assessment:   Patient rates overall health as fair  Patient feels that their physical health rating is Same  Eyesight was rated as Same  Hearing was rated as Same  Patient feels that their emotional and mental health rating is Same  Pain experienced in the last 7 days has been None  Patient states that she has experienced no weight loss or gain in last 6 months  Depression Screening:   PHQ-2 Score: 0      Fall Risk Screening: In the past year, patient has experienced: no history of falling in past year      Urinary Incontinence Screening:   Patient has leaked urine accidently in the last six months  Home Safety:  Patient does not have trouble with stairs inside or outside of their home  Patient has working smoke alarms and has no working carbon monoxide detector  Home safety hazards include: none  Nutrition:   Current diet is Regular  Medications:   Patient is not currently taking any over-the-counter supplements  Patient is not able to manage medications  Activities of Daily Living (ADLs)/Instrumental Activities of Daily Living (IADLs):   Walk and transfer into and out of bed and chair?: Yes  Dress and groom yourself?: Yes    Bathe or shower yourself?: Yes    Feed yourself? Yes  Do your laundry/housekeeping?: Yes  Manage your money, pay your bills and track your expenses?: Yes  Make your own meals?: Yes    Do your own shopping?: Yes    Previous Hospitalizations:   Any hospitalizations or ED visits within the last 12 months?: Yes    How many hospitalizations have you had in the last year?: 1-2    Advance Care Planning:   Living will: Yes    Durable POA for healthcare:  Yes    Advanced directive: Yes    Advanced directive counseling given: Yes    End of Life Decisions reviewed with patient: Yes    Provider agrees with end of life decisions: Yes      Cognitive Screening:   Provider or family/friend/caregiver concerned regarding cognition?: No    PREVENTIVE SCREENINGS      Cardiovascular Screening:    General: Screening Current and Risks and Benefits Discussed      Diabetes Screening:     General: Risks and Benefits Discussed and Screening Current      Colorectal Cancer Screening:     General: Screening Not Indicated and Risks and Benefits Discussed      Breast Cancer Screening:     General: Risks and Benefits Discussed    Due for: Mammogram        Cervical Cancer Screening:    General: Screening Not Indicated and Risks and Benefits Discussed      Osteoporosis Screening:    General: Risks and Benefits Discussed and Screening Current      Abdominal Aortic Aneurysm (AAA) Screening:        General: Risks and Benefits Discussed and Screening Current      Lung Cancer Screening:     General: Screening Not Indicated and Risks and Benefits Discussed      Hepatitis C Screening:    General: Risks and Benefits Discussed and Screening Not Indicated    Hep C Screening Accepted: No     Other Counseling Topics:   Car/seat belt/driving safety, skin self-exam and Calcium and Vitamin D Intake and calcium and vitamin D intake  No exam data present     Physical Exam:     There were no vitals taken for this visit      Physical Exam     Emir Bui MD

## 2020-08-19 ENCOUNTER — OFFICE VISIT (OUTPATIENT)
Dept: FAMILY MEDICINE CLINIC | Facility: CLINIC | Age: 85
End: 2020-08-19
Payer: MEDICARE

## 2020-08-19 VITALS
WEIGHT: 145 LBS | BODY MASS INDEX: 29.23 KG/M2 | HEIGHT: 59 IN | RESPIRATION RATE: 17 BRPM | HEART RATE: 66 BPM | TEMPERATURE: 97.9 F | DIASTOLIC BLOOD PRESSURE: 90 MMHG | OXYGEN SATURATION: 98 % | SYSTOLIC BLOOD PRESSURE: 182 MMHG

## 2020-08-19 DIAGNOSIS — Z00.00 INITIAL MEDICARE ANNUAL WELLNESS VISIT: Primary | ICD-10-CM

## 2020-08-19 DIAGNOSIS — Z12.31 ENCOUNTER FOR SCREENING MAMMOGRAM FOR MALIGNANT NEOPLASM OF BREAST: ICD-10-CM

## 2020-08-19 DIAGNOSIS — K21.9 GASTROESOPHAGEAL REFLUX DISEASE WITHOUT ESOPHAGITIS: ICD-10-CM

## 2020-08-19 DIAGNOSIS — I25.10 CORONARY ARTERY DISEASE INVOLVING NATIVE CORONARY ARTERY OF NATIVE HEART WITHOUT ANGINA PECTORIS: ICD-10-CM

## 2020-08-19 DIAGNOSIS — I48.0 PAROXYSMAL ATRIAL FIBRILLATION (HCC): ICD-10-CM

## 2020-08-19 DIAGNOSIS — I10 ESSENTIAL HYPERTENSION: ICD-10-CM

## 2020-08-19 DIAGNOSIS — E03.9 HYPOTHYROIDISM, UNSPECIFIED TYPE: ICD-10-CM

## 2020-08-19 DIAGNOSIS — E55.9 VITAMIN D DEFICIENCY: ICD-10-CM

## 2020-08-19 DIAGNOSIS — Z00.00 MEDICARE ANNUAL WELLNESS VISIT, SUBSEQUENT: ICD-10-CM

## 2020-08-19 PROCEDURE — 99214 OFFICE O/P EST MOD 30 MIN: CPT | Performed by: SPECIALIST

## 2020-08-19 PROCEDURE — 3077F SYST BP >= 140 MM HG: CPT | Performed by: SPECIALIST

## 2020-08-19 PROCEDURE — 3080F DIAST BP >= 90 MM HG: CPT | Performed by: SPECIALIST

## 2020-08-19 PROCEDURE — 1160F RVW MEDS BY RX/DR IN RCRD: CPT | Performed by: SPECIALIST

## 2020-08-19 PROCEDURE — 1125F AMNT PAIN NOTED PAIN PRSNT: CPT | Performed by: SPECIALIST

## 2020-08-19 PROCEDURE — 1170F FXNL STATUS ASSESSED: CPT | Performed by: SPECIALIST

## 2020-08-19 PROCEDURE — 3008F BODY MASS INDEX DOCD: CPT | Performed by: SPECIALIST

## 2020-08-19 PROCEDURE — 1036F TOBACCO NON-USER: CPT | Performed by: SPECIALIST

## 2020-08-19 PROCEDURE — G0439 PPPS, SUBSEQ VISIT: HCPCS | Performed by: SPECIALIST

## 2020-08-19 RX ORDER — DILTIAZEM HYDROCHLORIDE 240 MG/1
240 CAPSULE, COATED, EXTENDED RELEASE ORAL DAILY
Qty: 90 CAPSULE | Refills: 4 | Status: SHIPPED | OUTPATIENT
Start: 2020-08-19 | End: 2021-06-19 | Stop reason: SDUPTHER

## 2020-08-19 RX ORDER — ISOSORBIDE MONONITRATE 30 MG/1
TABLET, EXTENDED RELEASE ORAL
Qty: 180 TABLET | Refills: 3 | Status: SHIPPED | OUTPATIENT
Start: 2020-08-19 | End: 2021-09-28

## 2020-08-19 RX ORDER — LOSARTAN POTASSIUM 50 MG/1
50 TABLET ORAL EVERY 24 HOURS
Qty: 90 TABLET | Refills: 4 | Status: SHIPPED | OUTPATIENT
Start: 2020-08-19 | End: 2021-08-28

## 2020-08-19 RX ORDER — ISOSORBIDE MONONITRATE 30 MG/1
60 TABLET, EXTENDED RELEASE ORAL DAILY
Qty: 90 TABLET | Refills: 4 | Status: SHIPPED | OUTPATIENT
Start: 2020-08-19 | End: 2020-08-19

## 2020-08-19 RX ORDER — CLOPIDOGREL BISULFATE 75 MG/1
75 TABLET ORAL DAILY
Qty: 90 TABLET | Refills: 3 | Status: SHIPPED | OUTPATIENT
Start: 2020-08-19 | End: 2021-11-22

## 2020-08-19 RX ORDER — CHOLECALCIFEROL (VITAMIN D3) 125 MCG
2000 CAPSULE ORAL DAILY
Qty: 90 TABLET | Refills: 4 | Status: SHIPPED | OUTPATIENT
Start: 2020-08-19

## 2020-08-19 RX ORDER — LEVOTHYROXINE SODIUM 0.05 MG/1
50 TABLET ORAL DAILY
Qty: 90 TABLET | Refills: 3 | Status: SHIPPED | OUTPATIENT
Start: 2020-08-19

## 2020-08-19 NOTE — PROGRESS NOTES
Assessment/Plan:    27-year-old female accompanied by her niece patient is feeling well she has occasional palpitation and does have a history of paroxysmal atrial fibrillation she has normal rhythm at the present time based on physical exam in heart rate    She did have 1 time per between visits some chest pain she took 2 nitro with complete relief of her symptoms and no recurrence occasionally she feels as noted palpitations but at home she is quite self-sufficient she does her own laundry housekeeping she does have helped for snow removal yd work and groceries but other than that she does not have any symptoms she can walk more than 4 blocks and do 2 flights of steps without any problem    On arrival her blood pressure was too high I repeated it sitting and standing got 140-142     /    74       she only took her Synthroid today     she did not take her Cozaar     or her Cardizem      I recommended that when she gets up she should take her thyroid medicine with within 45 minutes to 60 she should eat breakfast and also and then take her blood pressure pills and not wait since her blood pressure will rise fairly rapid as she increases her activity is in the morning      Blood studies prior to next visit patient is stable    Patient seen in office today  During the visit I was accompanied by MA while physical exam was completed  No problem-specific Assessment & Plan notes found for this encounter           Problem List Items Addressed This Visit        Cardiovascular and Mediastinum    Paroxysmal atrial fibrillation (HCC)    Relevant Medications    diltiazem (Cartia XT) 240 mg 24 hr capsule    isosorbide mononitrate (IMDUR) 30 mg 24 hr tablet    clopidogrel (PLAVIX) 75 mg tablet      Other Visit Diagnoses     Initial Medicare annual wellness visit    -  Primary    Coronary artery disease involving native coronary artery of native heart without angina pectoris        Relevant Medications    diltiazem (Cartia XT) 240 mg 24 hr capsule    isosorbide mononitrate (IMDUR) 30 mg 24 hr tablet    losartan (COZAAR) 50 mg tablet    clopidogrel (PLAVIX) 75 mg tablet    Essential hypertension        Relevant Medications    diltiazem (Cartia XT) 240 mg 24 hr capsule    isosorbide mononitrate (IMDUR) 30 mg 24 hr tablet    losartan (COZAAR) 50 mg tablet    clopidogrel (PLAVIX) 75 mg tablet    Hypothyroidism, unspecified type        Relevant Medications    levothyroxine 50 mcg tablet    diltiazem (Cartia XT) 240 mg 24 hr capsule    isosorbide mononitrate (IMDUR) 30 mg 24 hr tablet    losartan (COZAAR) 50 mg tablet    clopidogrel (PLAVIX) 75 mg tablet    Gastroesophageal reflux disease without esophagitis        Relevant Medications    diltiazem (Cartia XT) 240 mg 24 hr capsule    isosorbide mononitrate (IMDUR) 30 mg 24 hr tablet    losartan (COZAAR) 50 mg tablet    clopidogrel (PLAVIX) 75 mg tablet    Medicare annual wellness visit, subsequent        Relevant Medications    diltiazem (Cartia XT) 240 mg 24 hr capsule    isosorbide mononitrate (IMDUR) 30 mg 24 hr tablet    losartan (COZAAR) 50 mg tablet    clopidogrel (PLAVIX) 75 mg tablet    Encounter for screening mammogram for malignant neoplasm of breast        Relevant Medications    diltiazem (Cartia XT) 240 mg 24 hr capsule    isosorbide mononitrate (IMDUR) 30 mg 24 hr tablet    losartan (COZAAR) 50 mg tablet    clopidogrel (PLAVIX) 75 mg tablet    Vitamin D deficiency        Relevant Medications    Cholecalciferol (Vitamin D3) 50 MCG (2000 UT) TABS            Subjective:            Patient ID: Elizabeth Liu is a 80 y o  female      51-year-old female who feels well she had 1 episode of chest pain she took 2 nitros with complete relief she has occasional palpitations but her heart rate is controlled she is on thyroid medications every morning which she took today but she did not take her blood pressure pills which may account for her pressure being 182/90 on arrival and I repeated it got systolic of 684 to I did tell her she should take her thyroid pills 1st thing in the morning eat her breakfast and take her blood pressure pills at that time and dull wait   she is presently space in the mouth throughout the day which is not needed      No active cardiopulmonary symptoms other than at 1 isolated episode of chest pain she could walk more than 4 blocks and 2 flights of steps she is quite sufficient home but she does have help for yard  Work    and grocery      Patient is presently stable her murmur is quite sufficient she has some trouble with remembering names of than that she is doing very well            The following portions of the patient's history were reviewed and updated as appropriate: allergies, past family history, past social history and past surgical history  Review of Systems   Constitutional: Negative for activity change, appetite change, chills, diaphoresis, fatigue and fever  HENT: Negative for congestion, sinus pressure, sinus pain and voice change  Eyes: Negative for visual disturbance  Respiratory: Negative for cough, choking, chest tightness, shortness of breath, wheezing and stridor  Cardiovascular: Negative for chest pain, palpitations and leg swelling  Gastrointestinal: Negative for abdominal distention, abdominal pain and anal bleeding  Genitourinary: Negative for difficulty urinating  Musculoskeletal: Negative for arthralgias, back pain, gait problem, joint swelling, myalgias and neck pain  Neurological: Negative for dizziness, tremors, seizures, syncope, facial asymmetry, speech difficulty, weakness, light-headedness, numbness and headaches  Psychiatric/Behavioral: Negative for agitation  The patient is not nervous/anxious            Objective:      BP (!) 182/90   Pulse 66   Temp 97 9 °F (36 6 °C)   Resp 17   Ht 4' 11 02" (1 499 m)   Wt 65 8 kg (145 lb)   SpO2 98%   BMI 29 27 kg/m²          Physical Exam  Constitutional:       General: She is not in acute distress  Appearance: Normal appearance  She is normal weight  She is not ill-appearing, toxic-appearing or diaphoretic  Eyes:      Extraocular Movements: Extraocular movements intact  Pupils: Pupils are equal, round, and reactive to light  Neck:      Musculoskeletal: No neck rigidity  Comments: No neck vein distention at 20°  Cardiovascular:      Rate and Rhythm: Normal rate and regular rhythm  Pulses: Normal pulses  Heart sounds: Normal heart sounds  No murmur  Pulmonary:      Effort: Pulmonary effort is normal       Breath sounds: Normal breath sounds  No rales  Abdominal:      General: Abdomen is flat  There is no distension  Skin:     General: Skin is warm and dry  Neurological:      General: No focal deficit present  Mental Status: She is alert  Motor: No weakness     Psychiatric:         Mood and Affect: Mood normal

## 2020-08-19 NOTE — PROGRESS NOTES
Assessment and Plan:     Problem List Items Addressed This Visit        Cardiovascular and Mediastinum    Paroxysmal atrial fibrillation (HCC)    Relevant Medications    diltiazem (Cartia XT) 240 mg 24 hr capsule    isosorbide mononitrate (IMDUR) 30 mg 24 hr tablet    clopidogrel (PLAVIX) 75 mg tablet      Other Visit Diagnoses     Initial Medicare annual wellness visit    -  Primary    Coronary artery disease involving native coronary artery of native heart without angina pectoris        Relevant Medications    diltiazem (Cartia XT) 240 mg 24 hr capsule    isosorbide mononitrate (IMDUR) 30 mg 24 hr tablet    losartan (COZAAR) 50 mg tablet    clopidogrel (PLAVIX) 75 mg tablet    Essential hypertension        Relevant Medications    diltiazem (Cartia XT) 240 mg 24 hr capsule    isosorbide mononitrate (IMDUR) 30 mg 24 hr tablet    losartan (COZAAR) 50 mg tablet    clopidogrel (PLAVIX) 75 mg tablet    Hypothyroidism, unspecified type        Relevant Medications    levothyroxine 50 mcg tablet    diltiazem (Cartia XT) 240 mg 24 hr capsule    isosorbide mononitrate (IMDUR) 30 mg 24 hr tablet    losartan (COZAAR) 50 mg tablet    clopidogrel (PLAVIX) 75 mg tablet    Gastroesophageal reflux disease without esophagitis        Relevant Medications    diltiazem (Cartia XT) 240 mg 24 hr capsule    isosorbide mononitrate (IMDUR) 30 mg 24 hr tablet    losartan (COZAAR) 50 mg tablet    clopidogrel (PLAVIX) 75 mg tablet    Medicare annual wellness visit, subsequent        Relevant Medications    diltiazem (Cartia XT) 240 mg 24 hr capsule    isosorbide mononitrate (IMDUR) 30 mg 24 hr tablet    losartan (COZAAR) 50 mg tablet    clopidogrel (PLAVIX) 75 mg tablet    Encounter for screening mammogram for malignant neoplasm of breast        Relevant Medications    diltiazem (Cartia XT) 240 mg 24 hr capsule    isosorbide mononitrate (IMDUR) 30 mg 24 hr tablet    losartan (COZAAR) 50 mg tablet    clopidogrel (PLAVIX) 75 mg tablet Vitamin D deficiency        Relevant Medications    Cholecalciferol (Vitamin D3) 50 MCG (2000 UT) TABS        BMI Counseling: Body mass index is 29 27 kg/m²  Follow-up plan was not completed due to elderly patient (72 years old) where weight reduction/weight gain would complicate underlying health condition such as: illness or physical disability, mental illness, dementia, or confusion and nutritional deficiency  Falls Plan of Care: balance, strength, and gait training instructions were provided  Preventive health issues were discussed with patient, and age appropriate screening tests were ordered as noted in patient's After Visit Summary  Personalized health advice and appropriate referrals for health education or preventive services given if needed, as noted in patient's After Visit Summary  History of Present Illness:     Patient presents for Welcome to Medicare visit       Patient Care Team:  Palomo Jamil MD as PCP - General (Internal Medicine)     Review of Systems:     Review of Systems   Problem List:     Patient Active Problem List   Diagnosis    Bilateral carotid artery disease ([facility] [address] )    Paroxysmal atrial fibrillation [facility])      Past Medical and Surgical History:     Past Medical History:   Diagnosis Date    Appendicitis     CAD (coronary artery disease)     Colon polyps     Diverticulosis      Past Surgical History:   Procedure Laterality Date    APPENDECTOMY      CARDIAC CATHETERIZATION  03/2009    CATARACT EXTRACTION      COLONOSCOPY W/ POLYPECTOMY  10/2012    HYSTERECTOMY      TONSILLECTOMY        Family History:     Family History   Problem Relation Age of Onset    No Known Problems Mother     No Known Problems Father       Social History:     E-Cigarette/Vaping    E-Cigarette Use Never User      E-Cigarette/Vaping Substances    Nicotine No     THC No     CBD No     Flavoring No     Other No     Unknown No      Social History     Socioeconomic History    Marital status:      Spouse name: None    Number of children: None    Years of education: None    Highest education level: None   Occupational History    Occupation: Retired   Social Needs    Financial resource strain: Not hard at all   Janis-Jona insecurity     Worry: Never true     Inability: Never true   Blyk needs     Medical: No     Non-medical: No   Tobacco Use    Smoking status: Never Smoker    Smokeless tobacco: Never Used   Substance and Sexual Activity    Alcohol use: Yes     Comment: Occasional    Drug use: No    Sexual activity: Not Currently   Lifestyle    Physical activity     Days per week: 4 days     Minutes per session: 30 min    Stress: Not at all   Relationships    Social connections     Talks on phone: More than three times a week     Gets together: More than three times a week     Attends Alevism service: 1 to 4 times per year     Active member of club or organization: No     Attends meetings of clubs or organizations: Never     Relationship status:     Intimate partner violence     Fear of current or ex partner: No     Emotionally abused: No     Physically abused: No     Forced sexual activity: No   Other Topics Concern    None   Social History Narrative    None      Medications and Allergies:     Current Outpatient Medications   Medication Sig Dispense Refill    aspirin (ASPIRIN LOW DOSE) 81 MG tablet Take 1 tablet (81 mg total) by mouth daily 100 tablet 4    clopidogrel (PLAVIX) 75 mg tablet Take 1 tablet (75 mg total) by mouth daily 90 tablet 3    diltiazem (Cartia XT) 240 mg 24 hr capsule Take 1 capsule (240 mg total) by mouth daily 90 capsule 4    isosorbide mononitrate (IMDUR) 30 mg 24 hr tablet Take 2 tablets (60 mg total) by mouth daily 90 tablet 4    levothyroxine 50 mcg tablet Take 1 tablet (50 mcg total) by mouth daily 90 tablet 3    lidocaine (LIDODERM) 5 % PLACE 1 PATCH ON THE SKIN DAILY FOR 7 DAYS   REMOVE AND DISCARD PATCH WITHIN 12 H  0    losartan (COZAAR) 50 mg tablet Take 1 tablet (50 mg total) by mouth every 24 hours 90 tablet 4    ranitidine (ZANTAC) 150 mg tablet Take 1 tablet (150 mg total) by mouth daily as needed for heartburn 90 tablet 4    Cholecalciferol (Vitamin D3) 50 MCG (2000 UT) TABS Take 1 tablet (2,000 Units total) by mouth daily 90 tablet 4     No current facility-administered medications for this visit  Allergies   Allergen Reactions    Amlodipine     Carvedilol     Digoxin     Losartan Edema    Nifedipine      Other reaction(s): Nausea and/or vomiting    Olmesartan     Other      Other reaction(s): Nausea and/or vomiting  CALCIUM CHANNEL BLOCKING AGENT    Rosuvastatin      Other reaction(s): Other (See Comments)  nausea    Sulfa Antibiotics       Immunizations: There is no immunization history on file for this patient  Health Maintenance: There are no preventive care reminders to display for this patient  Topic Date Due    DTaP,Tdap,and Td Vaccines (1 - Tdap) 03/31/1949    Pneumococcal Vaccine: 65+ Years (1 of 1 - PPSV23) 03/31/1993    Influenza Vaccine  07/01/2020      Medicare Screening Tests and Risk Assessments:     Adriana Crum is here for her Initial Wellness visit  Last Medicare Wellness visit information reviewed, patient interviewed and updates made to the record today  Health Risk Assessment:   Patient rates overall health as fair  Patient feels that their physical health rating is Same  Eyesight was rated as Same  Hearing was rated as Same  Patient feels that their emotional and mental health rating is Same  Pain experienced in the last 7 days has been None  Patient states that she has experienced no weight loss or gain in last 6 months  Depression Screening:   PHQ-2 Score: 0      Fall Risk Screening: In the past year, patient has experienced: no history of falling in past year      Urinary Incontinence Screening:   Patient has leaked urine accidently in the last six months  Home Safety:  Patient does not have trouble with stairs inside or outside of their home  Patient has working smoke alarms and has no working carbon monoxide detector  Home safety hazards include: none  Nutrition:   Current diet is Regular  Medications:   Patient is not currently taking any over-the-counter supplements  Patient is not able to manage medications  Activities of Daily Living (ADLs)/Instrumental Activities of Daily Living (IADLs):   Walk and transfer into and out of bed and chair?: Yes  Dress and groom yourself?: Yes    Bathe or shower yourself?: Yes    Feed yourself? Yes  Do your laundry/housekeeping?: Yes  Manage your money, pay your bills and track your expenses?: Yes  Make your own meals?: Yes    Do your own shopping?: Yes    Previous Hospitalizations:   Any hospitalizations or ED visits within the last 12 months?: Yes    How many hospitalizations have you had in the last year?: 1-2    Advance Care Planning:   Living will: Yes    Durable POA for healthcare:  Yes    Advanced directive: Yes    Advanced directive counseling given: Yes    End of Life Decisions reviewed with patient: Yes    Provider agrees with end of life decisions: Yes      Cognitive Screening:   Provider or family/friend/caregiver concerned regarding cognition?: No    PREVENTIVE SCREENINGS      Cardiovascular Screening:    General: Screening Current and Risks and Benefits Discussed      Diabetes Screening:     General: Risks and Benefits Discussed and Screening Current      Colorectal Cancer Screening:     General: Screening Not Indicated and Risks and Benefits Discussed      Breast Cancer Screening:     General: Risks and Benefits Discussed    Due for: Mammogram        Cervical Cancer Screening:    General: Screening Not Indicated and Risks and Benefits Discussed      Osteoporosis Screening:    General: Risks and Benefits Discussed and Screening Current      Abdominal Aortic Aneurysm (AAA) Screening:        General: Risks and Benefits Discussed and Screening Current      Lung Cancer Screening:     General: Screening Not Indicated and Risks and Benefits Discussed      Hepatitis C Screening:    General: Risks and Benefits Discussed and Screening Not Indicated    Hep C Screening Accepted: No     Other Counseling Topics:   Car/seat belt/driving safety, skin self-exam and Calcium and Vitamin D Intake and calcium and vitamin D intake       No exam data present     Physical Exam:     BP (!) 182/90   Pulse 66   Temp 97 9 °F (36 6 °C)   Resp 17   Ht 4' 11 02" (1 499 m)   Wt 65 8 kg (145 lb)   SpO2 98%   BMI 29 27 kg/m²     Physical Exam     Jim Prado MD

## 2020-08-19 NOTE — PATIENT INSTRUCTIONS
Follow-up in 4-6 months    Do blood work prior to next visit    Be very careful with laundry showers and especially when  Going  Outdoors  When   inclement   Weather    approaches

## 2021-03-18 ENCOUNTER — OFFICE VISIT (OUTPATIENT)
Dept: FAMILY MEDICINE CLINIC | Facility: CLINIC | Age: 86
End: 2021-03-18
Payer: MEDICARE

## 2021-03-18 VITALS
DIASTOLIC BLOOD PRESSURE: 90 MMHG | RESPIRATION RATE: 16 BRPM | WEIGHT: 144.4 LBS | HEIGHT: 59 IN | SYSTOLIC BLOOD PRESSURE: 170 MMHG | HEART RATE: 56 BPM | TEMPERATURE: 97 F | OXYGEN SATURATION: 96 % | BODY MASS INDEX: 29.11 KG/M2

## 2021-03-18 DIAGNOSIS — E55.9 VITAMIN D DEFICIENCY: ICD-10-CM

## 2021-03-18 DIAGNOSIS — I20.9 ANGINA PECTORIS (HCC): ICD-10-CM

## 2021-03-18 DIAGNOSIS — R00.1 BRADYCARDIA BY ELECTROCARDIOGRAM: ICD-10-CM

## 2021-03-18 DIAGNOSIS — I10 WHITE COAT SYNDROME WITH DIAGNOSIS OF HYPERTENSION: ICD-10-CM

## 2021-03-18 DIAGNOSIS — R41.89 COGNITIVE DECLINE: ICD-10-CM

## 2021-03-18 DIAGNOSIS — I48.0 PAROXYSMAL ATRIAL FIBRILLATION (HCC): ICD-10-CM

## 2021-03-18 DIAGNOSIS — I10 HYPERTENSION, ACCELERATED: Primary | ICD-10-CM

## 2021-03-18 DIAGNOSIS — E03.9 ACQUIRED HYPOTHYROIDISM: ICD-10-CM

## 2021-03-18 PROBLEM — K92.2 GI BLEED: Status: ACTIVE | Noted: 2019-09-14

## 2021-03-18 PROBLEM — R42 DIZZINESS: Status: ACTIVE | Noted: 2018-07-11

## 2021-03-18 PROCEDURE — 99214 OFFICE O/P EST MOD 30 MIN: CPT | Performed by: INTERNAL MEDICINE

## 2021-03-18 NOTE — PROGRESS NOTES
Assessment/Plan:   this 59-year-old female complains of mild memory loss and sites his an example to look up recipes in a book instead of just knowing them  However she does score 3rd 28/30 on the mini-mental status examination  Her son does not notice her failing in any way  She is able to live alone without difficulty  Is felt that she may have the beginnings of mild cognitive decline or just age appropriate memory loss  This can be followed clinically  If necessary further workup could be done  In this 80year-old  Patient has a history of white coat hypertension and states that her blood pressure has always less than 140 at home  She was asked to record her blood pressures at home and bring them to the next visit as well as avoid salty food and continue to take this the same medication  It was felt that she has component of white coat syndrome with a diagnosis of hypertension  Patient has hypothyroidism and is stable on her current thyroid dose  The patient takes aspirin and clopidogrel and an anti anginal regime  She is followed by Cardiology  She states she never had a stent but did have heart catheterizations in the remote past   She does not currently the experience chest pain but does have nitroglycerin to take just in case  Patient's vitamin-D level was low and her vitamin D3 was increased to 3000 units daily  Repeat level was not ordered since Medicare covers this only on a yearly basis or not all  Diet reviewed  Lifestyle modifications reviewed  Medications reviewed and ordered  Laboratory tests and studies reviewed and ordered  All patient's questions answered to patient satisfaction      Chief Complaint   Patient presents with    Memory Loss         Diagnoses and all orders for this visit:    Acquired hypothyroidism    Hypertension, accelerated    Paroxysmal atrial fibrillation (HCC)    Bradycardia by electrocardiogram        Subjective:      this 59-year-old female complains of mild memory loss and sites his an example to look up recipes in a book instead of just knowing them  However she does score 3rd 28/30 on the mini-mental status examination  Her son does not notice her failing in any way  She is able to live alone without difficulty  Is felt that she may have the beginnings of mild cognitive decline or just age appropriate memory loss  This can be followed clinically  If necessary further workup could be done  In this 80year-old  Patient has a history of white coat hypertension and states that her blood pressure has always less than 140 at home  She was asked to record her blood pressures at home and bring them to the next visit as well as avoid salty food and continue to take this the same medication  It was felt that she has component of white coat syndrome with a diagnosis of hypertension  Patient has hypothyroidism and is stable on her current thyroid dose  The patient takes aspirin and clopidogrel and an anti anginal regime  She is followed by Cardiology  She states she never had a stent but did have heart catheterizations in the remote past   She does not currently the experience chest pain but does have nitroglycerin to take just in case  Patient ID: Najma Lemus is a 80 y o  female          Current Outpatient Medications:     aspirin (ASPIRIN LOW DOSE) 81 MG tablet, Take 1 tablet (81 mg total) by mouth daily, Disp: 100 tablet, Rfl: 4    Cholecalciferol (Vitamin D3) 50 MCG (2000 UT) TABS, Take 1 tablet (2,000 Units total) by mouth daily, Disp: 90 tablet, Rfl: 4    clopidogrel (PLAVIX) 75 mg tablet, Take 1 tablet (75 mg total) by mouth daily, Disp: 90 tablet, Rfl: 3    diltiazem (Cartia XT) 240 mg 24 hr capsule, Take 1 capsule (240 mg total) by mouth daily, Disp: 90 capsule, Rfl: 4    isosorbide mononitrate (IMDUR) 30 mg 24 hr tablet, TAKE 2 TABLETS(60 MG) BY MOUTH DAILY, Disp: 180 tablet, Rfl: 3    levothyroxine 50 mcg tablet, Take 1 tablet (50 mcg total) by mouth daily, Disp: 90 tablet, Rfl: 3    lidocaine (LIDODERM) 5 %, PLACE 1 PATCH ON THE SKIN DAILY FOR 7 DAYS  REMOVE AND DISCARD PATCH WITHIN 12 H, Disp: , Rfl: 0    losartan (COZAAR) 50 mg tablet, Take 1 tablet (50 mg total) by mouth every 24 hours, Disp: 90 tablet, Rfl: 4    ranitidine (ZANTAC) 150 mg tablet, Take 1 tablet (150 mg total) by mouth daily as needed for heartburn, Disp: 90 tablet, Rfl: 4    The following portions of the patient's history were reviewed and updated as appropriate: allergies, current medications, past family history, past medical history, past social history, past surgical history and problem list     Review of Systems   Constitutional: Negative for appetite change, fatigue, fever and unexpected weight change  HENT: Negative for rhinorrhea, sinus pressure, sinus pain, sneezing and sore throat  Eyes: Negative for visual disturbance  Respiratory: Negative for cough, chest tightness, shortness of breath and wheezing  Cardiovascular: Negative for chest pain, palpitations and leg swelling  Gastrointestinal: Negative for abdominal distention, abdominal pain, blood in stool, constipation, diarrhea, nausea and vomiting  Endocrine: Negative for polydipsia and polyuria  Genitourinary: Negative for decreased urine volume, difficulty urinating, dysuria, hematuria and urgency  Musculoskeletal: Negative for arthralgias, back pain, joint swelling and neck pain  Skin: Negative for rash  Allergic/Immunologic: Negative for environmental allergies  Neurological: Negative for tremors, weakness, light-headedness, numbness and headaches  Mild memory loss with the patient having to look up recipes in a book instead of just knowing them  Hematological: Does not bruise/bleed easily  Psychiatric/Behavioral: Negative for agitation, behavioral problems, confusion and dysphoric mood  The patient is not nervous/anxious            Family History Problem Relation Age of Onset    No Known Problems Mother     No Known Problems Father        Past Medical History:   Diagnosis Date    Appendicitis     CAD (coronary artery disease)     Colon polyps     Diverticulosis     Hypertension, accelerated 6/11/2016       Past Surgical History:   Procedure Laterality Date    APPENDECTOMY      CARDIAC CATHETERIZATION  03/2009    CATARACT EXTRACTION      COLONOSCOPY W/ POLYPECTOMY  10/2012    HYSTERECTOMY      TONSILLECTOMY         Social History     Socioeconomic History    Marital status:      Spouse name: None    Number of children: None    Years of education: None    Highest education level: None   Occupational History    Occupation: Retired   Social Needs    Financial resource strain: Not hard at all   [employer] insecurity     Worry: Never true     Inability: Never true   [employer] needs     Medical: No     Non-medical: No   Tobacco Use    Smoking status: Never Smoker    Smokeless tobacco: Never Used   Substance and Sexual Activity    Alcohol use: Yes     Comment: Occasional    Drug use: No    Sexual activity: Not Currently   Lifestyle    Physical activity     Days per week: 4 days     Minutes per session: 30 min    Stress: Not at all   Relationships    Social connections     Talks on phone: More than three times a week     Gets together: More than three times a week     Attends Hoahaoism service: 1 to 4 times per year     Active member of club or organization: No     Attends meetings of clubs or organizations: Never     Relationship status:      Intimate partner violence     Fear of current or ex partner: No     Emotionally abused: No     Physically abused: No     Forced sexual activity: No   Other Topics Concern    None   Social History Narrative    None       Allergies   Allergen Reactions    Amlodipine     Carvedilol     Digoxin     Losartan Edema    Nifedipine      Other reaction(s): Nausea and/or vomiting    Olmesartan     Other      Other reaction(s): Nausea and/or vomiting  CALCIUM CHANNEL BLOCKING AGENT    Rosuvastatin      Other reaction(s): Other (See Comments)  nausea    Sulfa Antibiotics             Objective:    /90 (BP Location: Left arm, Patient Position: Sitting, Cuff Size: Adult)   Pulse 56   Temp (!) 97 °F (36 1 °C)   Resp 16   Ht 4' 11" (1 499 m)   Wt 65 5 kg (144 lb 6 4 oz)   SpO2 96%   BMI 29 17 kg/m²        Physical Exam  Constitutional:       General: She is not in acute distress  Appearance: She is well-developed  HENT:      Head: Normocephalic and atraumatic  Nose: Nose normal       Mouth/Throat:      Pharynx: No oropharyngeal exudate  Eyes:      General: No scleral icterus  Conjunctiva/sclera: Conjunctivae normal       Pupils: Pupils are equal, round, and reactive to light  Neck:      Musculoskeletal: Normal range of motion and neck supple  Thyroid: No thyromegaly  Vascular: No JVD  Trachea: No tracheal deviation  Cardiovascular:      Rate and Rhythm: Normal rate and regular rhythm  Heart sounds: Normal heart sounds  No murmur  No friction rub  No gallop  Pulmonary:      Effort: Pulmonary effort is normal  No respiratory distress  Breath sounds: No wheezing or rales  Chest:      Chest wall: No tenderness  Abdominal:      General: Bowel sounds are normal  There is no distension  Palpations: Abdomen is soft  There is no mass  Tenderness: There is no abdominal tenderness  There is no guarding or rebound  Musculoskeletal: Normal range of motion  General: No deformity  Lymphadenopathy:      Cervical: No cervical adenopathy  Skin:     General: Skin is warm and dry  Findings: No rash  Neurological:      Mental Status: She is alert and oriented to person, place, and time  Cranial Nerves: No cranial nerve deficit        Coordination: Coordination normal       Comments: Mini-mental status examination was done and patient scored 28/30 remembering a only 2/3 objects and missing a letter on world spelled backwards  Psychiatric:         Behavior: Behavior normal          Thought Content:  Thought content normal          Judgment: Judgment normal

## 2021-06-19 ENCOUNTER — NURSE TRIAGE (OUTPATIENT)
Dept: OTHER | Facility: OTHER | Age: 86
End: 2021-06-19

## 2021-06-19 DIAGNOSIS — K21.9 GASTROESOPHAGEAL REFLUX DISEASE WITHOUT ESOPHAGITIS: ICD-10-CM

## 2021-06-19 DIAGNOSIS — E03.9 HYPOTHYROIDISM, UNSPECIFIED TYPE: ICD-10-CM

## 2021-06-19 DIAGNOSIS — I10 ESSENTIAL HYPERTENSION: ICD-10-CM

## 2021-06-19 DIAGNOSIS — Z00.00 MEDICARE ANNUAL WELLNESS VISIT, SUBSEQUENT: ICD-10-CM

## 2021-06-19 DIAGNOSIS — I25.10 CORONARY ARTERY DISEASE INVOLVING NATIVE CORONARY ARTERY OF NATIVE HEART WITHOUT ANGINA PECTORIS: ICD-10-CM

## 2021-06-19 DIAGNOSIS — Z12.31 ENCOUNTER FOR SCREENING MAMMOGRAM FOR MALIGNANT NEOPLASM OF BREAST: ICD-10-CM

## 2021-06-19 RX ORDER — DILTIAZEM HYDROCHLORIDE 240 MG/1
240 CAPSULE, COATED, EXTENDED RELEASE ORAL DAILY
Qty: 7 CAPSULE | Refills: 0 | Status: SHIPPED | OUTPATIENT
Start: 2021-06-19 | End: 2021-06-23 | Stop reason: SDUPTHER

## 2021-06-19 NOTE — TELEPHONE ENCOUNTER
Reason for Disposition   Caller requesting a refill, no triage required, and triager able to refill per unit policy    Answer Assessment - Initial Assessment Questions  1  NAME of MEDICATION: "What medicine are you calling about?"      Cartia     2  QUESTION: "What is your question?"      Need a refill    3  PRESCRIBING HCP: "Who prescribed it?" Reason: if prescribed by specialist, call should be referred to that group  Dr Sukhdeep Castellanos     4  SYMPTOMS: "Do you have any symptoms?"      Denies     5  SEVERITY: If symptoms are present, ask "Are they mild, moderate or severe?"      Denies     6    PREGNANCY:  "Is there any chance that you are pregnant?" "When was your last menstrual period?"      Denies    Protocols used: MEDICATION QUESTION CALL-ADULT-

## 2021-06-22 ENCOUNTER — OFFICE VISIT (OUTPATIENT)
Dept: FAMILY MEDICINE CLINIC | Facility: CLINIC | Age: 86
End: 2021-06-22
Payer: MEDICARE

## 2021-06-22 VITALS
WEIGHT: 142.8 LBS | TEMPERATURE: 98.2 F | DIASTOLIC BLOOD PRESSURE: 80 MMHG | HEART RATE: 55 BPM | BODY MASS INDEX: 28.84 KG/M2 | SYSTOLIC BLOOD PRESSURE: 128 MMHG | OXYGEN SATURATION: 98 %

## 2021-06-22 DIAGNOSIS — N18.30 STAGE 3 CHRONIC KIDNEY DISEASE, UNSPECIFIED WHETHER STAGE 3A OR 3B CKD (HCC): ICD-10-CM

## 2021-06-22 DIAGNOSIS — H81.13 BENIGN PAROXYSMAL POSITIONAL VERTIGO DUE TO BILATERAL VESTIBULAR DISORDER: Primary | ICD-10-CM

## 2021-06-22 DIAGNOSIS — K55.9 ISCHEMIC BOWEL DISEASE (HCC): ICD-10-CM

## 2021-06-22 PROCEDURE — 99214 OFFICE O/P EST MOD 30 MIN: CPT | Performed by: FAMILY MEDICINE

## 2021-06-23 DIAGNOSIS — K21.9 GASTROESOPHAGEAL REFLUX DISEASE WITHOUT ESOPHAGITIS: ICD-10-CM

## 2021-06-23 DIAGNOSIS — Z00.00 MEDICARE ANNUAL WELLNESS VISIT, SUBSEQUENT: ICD-10-CM

## 2021-06-23 DIAGNOSIS — I25.10 CORONARY ARTERY DISEASE INVOLVING NATIVE CORONARY ARTERY OF NATIVE HEART WITHOUT ANGINA PECTORIS: ICD-10-CM

## 2021-06-23 DIAGNOSIS — Z12.31 ENCOUNTER FOR SCREENING MAMMOGRAM FOR MALIGNANT NEOPLASM OF BREAST: ICD-10-CM

## 2021-06-23 DIAGNOSIS — I10 ESSENTIAL HYPERTENSION: ICD-10-CM

## 2021-06-23 DIAGNOSIS — E03.9 HYPOTHYROIDISM, UNSPECIFIED TYPE: ICD-10-CM

## 2021-06-23 RX ORDER — DILTIAZEM HYDROCHLORIDE 240 MG/1
240 CAPSULE, COATED, EXTENDED RELEASE ORAL DAILY
Qty: 30 CAPSULE | Refills: 6 | Status: SHIPPED | OUTPATIENT
Start: 2021-06-23 | End: 2021-06-24

## 2021-06-23 NOTE — PROGRESS NOTES
Assessment/Plan:    80year-old woman with: BPPV, CKD 3 ischemic bowel disease continue current medications discussed supportive care return parameters will refer for [facility] for vestibular therapy and Epley maneuvers encouraged close follow-up with Cardiology discussed supportive care return parameters otherwise  Encourage following her diet as well    No problem-specific Assessment & Plan notes found for this encounter  Diagnoses and all orders for this visit:    Benign paroxysmal positional vertigo due to bilateral vestibular disorder  -     Ambulatory referral to Physical Therapy; Future    Stage 3 chronic kidney disease, unspecified whether stage 3a or 3b CKD (HCC)    Ischemic bowel disease ([facility], [address] )          Subjective:     Chief Complaint   Patient presents with    Medication Problem        Patient ID: Ilan Cole is a 80 y o  female  Patient is a 44-year-old woman with a history of CKD 3 and ischemic bowel disease who presents complaining of some dizziness S and room spinning no fevers chills nausea vomiting tolerating p o  intake      The following portions of the patient's history were reviewed and updated as appropriate: allergies, current medications, past family history, past medical history, past social history, past surgical history and problem list     Review of Systems   Constitutional: Negative  HENT: Negative  Eyes: Negative  Respiratory: Negative  Cardiovascular: Negative  Gastrointestinal: Negative  Endocrine: Negative  Genitourinary: Negative  Musculoskeletal: Negative  Allergic/Immunologic: Negative  Neurological: Positive for dizziness  Hematological: Negative  Psychiatric/Behavioral: Negative  All other systems reviewed and are negative          Objective:      /80   Pulse 55   Temp 98 2 °F (36 8 °C)   Wt 64 8 kg (142 lb 12 8 oz)   SpO2 98%   BMI 28 84 kg/m²          Physical Exam  Constitutional:       Appearance: She is well-developed  HENT:      Head: Atraumatic  Right Ear: External ear normal       Left Ear: External ear normal    Eyes:      Conjunctiva/sclera: Conjunctivae normal       Pupils: Pupils are equal, round, and reactive to light  Cardiovascular:      Rate and Rhythm: Normal rate and regular rhythm  Heart sounds: Normal heart sounds  Pulmonary:      Effort: Pulmonary effort is normal  No respiratory distress  Breath sounds: Normal breath sounds  Abdominal:      General: Bowel sounds are normal  There is no distension  Palpations: Abdomen is soft  Tenderness: There is no abdominal tenderness  There is no guarding or rebound  Musculoskeletal:         General: Normal range of motion  Cervical back: Normal range of motion  Skin:     General: Skin is warm and dry  Neurological:      Mental Status: She is alert and oriented to person, place, and time  Cranial Nerves: No cranial nerve deficit  Comments: Positive [place]   Psychiatric:         Behavior: Behavior normal          Thought Content: Thought content normal          Judgment: Judgment normal          BMI Counseling: Body mass index is 28 84 kg/m²  The BMI is above normal  Nutrition recommendations include encouraging healthy choices of fruits and vegetables  Exercise recommendations include moderate physical activity 150 minutes/week

## 2021-06-24 DIAGNOSIS — K21.9 GASTROESOPHAGEAL REFLUX DISEASE WITHOUT ESOPHAGITIS: ICD-10-CM

## 2021-06-24 DIAGNOSIS — Z12.31 ENCOUNTER FOR SCREENING MAMMOGRAM FOR MALIGNANT NEOPLASM OF BREAST: ICD-10-CM

## 2021-06-24 DIAGNOSIS — I25.10 CORONARY ARTERY DISEASE INVOLVING NATIVE CORONARY ARTERY OF NATIVE HEART WITHOUT ANGINA PECTORIS: ICD-10-CM

## 2021-06-24 DIAGNOSIS — Z00.00 MEDICARE ANNUAL WELLNESS VISIT, SUBSEQUENT: ICD-10-CM

## 2021-06-24 DIAGNOSIS — E03.9 HYPOTHYROIDISM, UNSPECIFIED TYPE: ICD-10-CM

## 2021-06-24 DIAGNOSIS — I10 ESSENTIAL HYPERTENSION: ICD-10-CM

## 2021-06-24 RX ORDER — DILTIAZEM HYDROCHLORIDE 240 MG/1
CAPSULE, COATED, EXTENDED RELEASE ORAL
Qty: 90 CAPSULE | Refills: 0 | Status: SHIPPED | OUTPATIENT
Start: 2021-06-24 | End: 2021-09-28

## 2021-08-24 ENCOUNTER — OFFICE VISIT (OUTPATIENT)
Dept: FAMILY MEDICINE CLINIC | Facility: CLINIC | Age: 86
End: 2021-08-24
Payer: MEDICARE

## 2021-08-24 VITALS
BODY MASS INDEX: 29.15 KG/M2 | WEIGHT: 144.6 LBS | HEART RATE: 62 BPM | DIASTOLIC BLOOD PRESSURE: 86 MMHG | RESPIRATION RATE: 18 BRPM | HEIGHT: 59 IN | OXYGEN SATURATION: 98 % | SYSTOLIC BLOOD PRESSURE: 170 MMHG | TEMPERATURE: 96 F

## 2021-08-24 DIAGNOSIS — I87.2 CHRONIC VENOUS STASIS DERMATITIS: ICD-10-CM

## 2021-08-24 DIAGNOSIS — R42 DIZZINESS: ICD-10-CM

## 2021-08-24 DIAGNOSIS — Z00.00 MEDICARE ANNUAL WELLNESS VISIT, SUBSEQUENT: Primary | ICD-10-CM

## 2021-08-24 DIAGNOSIS — E03.9 ACQUIRED HYPOTHYROIDISM: ICD-10-CM

## 2021-08-24 DIAGNOSIS — H81.13 BENIGN PAROXYSMAL POSITIONAL VERTIGO DUE TO BILATERAL VESTIBULAR DISORDER: ICD-10-CM

## 2021-08-24 DIAGNOSIS — I20.9 ANGINA PECTORIS (HCC): ICD-10-CM

## 2021-08-24 DIAGNOSIS — M83.3 ADULT OSTEOMALACIA DUE TO MALNUTRITION: ICD-10-CM

## 2021-08-24 PROCEDURE — 1123F ACP DISCUSS/DSCN MKR DOCD: CPT | Performed by: INTERNAL MEDICINE

## 2021-08-24 PROCEDURE — G0439 PPPS, SUBSEQ VISIT: HCPCS | Performed by: INTERNAL MEDICINE

## 2021-08-24 PROCEDURE — 99214 OFFICE O/P EST MOD 30 MIN: CPT | Performed by: INTERNAL MEDICINE

## 2021-08-24 RX ORDER — TRIAMCINOLONE ACETONIDE 5 MG/G
CREAM TOPICAL 3 TIMES DAILY
Qty: 454 G | Refills: 3 | Status: SHIPPED | OUTPATIENT
Start: 2021-08-24

## 2021-08-24 NOTE — PROGRESS NOTES
Assessment and Plan:     Problem List Items Addressed This Visit        Endocrine    Acquired hypothyroidism       Cardiovascular and Mediastinum    Angina pectoris (HCC)       Nervous and Auditory    Benign paroxysmal positional vertigo due to bilateral vestibular disorder       Other    Dizziness      Other Visit Diagnoses     Medicare annual wellness visit, subsequent    -  Primary    Chronic venous stasis dermatitis               Preventive health issues were discussed with patient, and age appropriate screening tests were ordered as noted in patient's After Visit Summary  Personalized health advice and appropriate referrals for health education or preventive services given if needed, as noted in patient's After Visit Summary       History of Present Illness:     Patient presents for Medicare Annual Wellness visit    Patient Care Team:  Sujatha Valencia MD as PCP - General (Internal Medicine)     Problem List:     Patient Active Problem List   Diagnosis    Bilateral carotid artery disease (HCC)    Paroxysmal atrial fibrillation ([address] )    Atherosclerosis of arteries of extremities ([address] )    Arthropathy    Bradycardia by electrocardiogram    Dizziness    Edema    GI bleed    Hypertension, accelerated    Incomplete right bundle branch block    Acquired hypothyroidism    Vitamin D deficiency    Angina pectoris ([address] )    Benign paroxysmal positional vertigo due to bilateral vestibular disorder    Stage 3 chronic kidney disease, unspecified whether stage 3a or 3b CKD ([address] )      Past Medical and Surgical History:     Past Medical History:   Diagnosis Date    Appendicitis     CAD (coronary artery disease)     Colon polyps     Diverticulosis     Hypertension, accelerated 6/11/2016    Stage 3 chronic kidney disease, unspecified whether stage 3a or 3b CKD ([address] ) 6/22/2021     Past Surgical History:   Procedure Laterality Date    APPENDECTOMY      CARDIAC CATHETERIZATION  03/2009    CATARACT EXTRACTION  COLONOSCOPY W/ POLYPECTOMY  10/2012    HYSTERECTOMY      TONSILLECTOMY        Family History:     Family History   Problem Relation Age of Onset    No Known Problems Mother     No Known Problems Father       Social History:     Social History     Socioeconomic History    Marital status:      Spouse name: None    Number of children: None    Years of education: None    Highest education level: None   Occupational History    Occupation: Retired   Tobacco Use    Smoking status: Never Smoker    Smokeless tobacco: Never Used   Vaping Use    Vaping Use: Never used   Substance and Sexual Activity    Alcohol use: Yes     Comment: Occasional    Drug use: No    Sexual activity: Not Currently   Other Topics Concern    None   Social History Narrative    None     Social Determinants of Health     Financial Resource Strain:     Difficulty of Paying Living Expenses:    Food Insecurity:     Worried About Running Out of Food in the Last Year:     Ran Out of Food in the Last Year:    Transportation Needs:     Lack of Transportation (Medical):      Lack of Transportation (Non-Medical):    Physical Activity:     Days of Exercise per Week:     Minutes of Exercise per Session:    Stress:     Feeling of Stress :    Social Connections:     Frequency of Communication with Friends and Family:     Frequency of Social Gatherings with Friends and Family:     Attends Taoist Services:     Active Member of Clubs or Organizations:     Attends Club or Organization Meetings:     Marital Status:    Intimate Partner Violence:     Fear of Current or Ex-Partner:     Emotionally Abused:     Physically Abused:     Sexually Abused:       Medications and Allergies:     Current Outpatient Medications   Medication Sig Dispense Refill    aspirin (ASPIRIN LOW DOSE) 81 MG tablet Take 1 tablet (81 mg total) by mouth daily 100 tablet 4    Cholecalciferol (Vitamin D3) 50 MCG (2000 UT) TABS Take 1 tablet (2,000 Units total) by mouth daily 90 tablet 4    clopidogrel (PLAVIX) 75 mg tablet Take 1 tablet (75 mg total) by mouth daily 90 tablet 3    diltiazem (CARDIZEM CD) 240 mg 24 hr capsule TAKE 1 CAPSULE(240 MG) BY MOUTH DAILY FOR 7 DAYS 90 capsule 0    isosorbide mononitrate (IMDUR) 30 mg 24 hr tablet TAKE 2 TABLETS(60 MG) BY MOUTH DAILY 180 tablet 3    levothyroxine 50 mcg tablet Take 1 tablet (50 mcg total) by mouth daily 90 tablet 3    lidocaine (LIDODERM) 5 % PLACE 1 PATCH ON THE SKIN DAILY FOR 7 DAYS  REMOVE AND DISCARD PATCH WITHIN 12 H (Patient not taking: Reported on 6/22/2021)  0    losartan (COZAAR) 50 mg tablet Take 1 tablet (50 mg total) by mouth every 24 hours 90 tablet 4    ranitidine (ZANTAC) 150 mg tablet Take 1 tablet (150 mg total) by mouth daily as needed for heartburn (Patient not taking: Reported on 6/22/2021) 90 tablet 4     No current facility-administered medications for this visit  Allergies   Allergen Reactions    Amlodipine     Carvedilol     Digoxin     Losartan Edema    Nifedipine      Other reaction(s): Nausea and/or vomiting    Olmesartan     Other      Other reaction(s): Nausea and/or vomiting  CALCIUM CHANNEL BLOCKING AGENT    Rosuvastatin      Other reaction(s): Other (See Comments)  nausea    Sulfa Antibiotics       Immunizations:     Immunization History   Administered Date(s) Administered    SARS-CoV-2 / COVID-19 mRNA IM ([employer]NFOC[employer]) 02/06/2021, 02/27/2021      Health Maintenance: There are no preventive care reminders to display for this patient        Topic Date Due    DTaP,Tdap,and Td Vaccines (1 - Tdap) Never done    Pneumococcal Vaccine: 65+ Years (1 of 1 - PPSV23) Never done    Influenza Vaccine (1) 09/01/2021      Medicare Health Risk Assessment:     /86 (BP Location: Left arm, Patient Position: Sitting, Cuff Size: Adult)   Pulse 62   Temp (!) 96 °F (35 6 °C)   Resp 18   Ht 4' 11" (1 499 m)   Wt 65 6 kg (144 lb 9 6 oz)   SpO2 98%   BMI 29 21 kg/m²      Yumiko Nix is here for her Subsequent Wellness visit  Health Risk Assessment:   Patient rates overall health as good  Patient feels that their physical health rating is same  Patient is satisfied with their life  Eyesight was rated as same  Hearing was rated as same  Patient feels that their emotional and mental health rating is same  Patients states they are never, rarely angry  Patient states they are sometimes unusually tired/fatigued  Pain experienced in the last 7 days has been some  Patient's pain rating has been 5/10  Patient states that she has experienced no weight loss or gain in last 6 months  Depression Screening:   PHQ-2 Score: 0      Fall Risk Screening: In the past year, patient has experienced: no history of falling in past year      Urinary Incontinence Screening:   Patient has leaked urine accidently in the last six months  Home Safety:  Patient has trouble with stairs inside or outside of their home  Patient has working smoke alarms and has working carbon monoxide detector  Home safety hazards include: none  Nutrition:   Current diet is Regular  Medications:   Patient is currently taking over-the-counter supplements  OTC medications include: see medication list  Patient is able to manage medications  Activities of Daily Living (ADLs)/Instrumental Activities of Daily Living (IADLs):   Walk and transfer into and out of bed and chair?: Yes  Dress and groom yourself?: Yes    Bathe or shower yourself?: Yes    Feed yourself? Yes  Do your laundry/housekeeping?: Yes  Manage your money, pay your bills and track your expenses?: Yes  Make your own meals?: Yes    Do your own shopping?: Yes    Previous Hospitalizations:   Any hospitalizations or ED visits within the last 12 months?: No      Advance Care Planning:   Living will: Yes    Durable POA for healthcare:  Yes    Advanced directive: Yes      Cognitive Screening:   Provider or family/friend/caregiver concerned regarding cognition?: Yes    PREVENTIVE SCREENINGS      Cardiovascular Screening:    General: Screening Current and Risks and Benefits Discussed      Diabetes Screening:     General: Risks and Benefits Discussed and Screening Current      Colorectal Cancer Screening:     General: Screening Not Indicated, Risks and Benefits Discussed and Screening Current      Breast Cancer Screening:     General: Risks and Benefits Discussed and Screening Current      Cervical Cancer Screening:    General: Screening Not Indicated, Risks and Benefits Discussed and Screening Current      Osteoporosis Screening:    General: Risks and Benefits Discussed and Screening Current      Abdominal Aortic Aneurysm (AAA) Screening:        General: Risks and Benefits Discussed and Screening Current      Lung Cancer Screening:     General: Screening Not Indicated, Risks and Benefits Discussed and Screening Current      Hepatitis C Screening:    General: Risks and Benefits Discussed and Screening Current    Screening, Brief Intervention, and Referral to Treatment (SBIRT)    Screening  Typical number of drinks in a day: 0  Typical number of drinks in a week: 0  Interpretation: Low risk drinking behavior  Single Item Drug Screening:  How often have you used an illegal drug (including marijuana) or a prescription medication for non-medical reasons in the past year? never    Single Item Drug Screen Score: 0  Interpretation: Negative screen for possible drug use disorder    Brief Intervention  Alcohol & drug use screenings were reviewed  No concerns regarding substance use disorder identified  Other Counseling Topics:   Car/seat belt/driving safety, skin self-exam and calcium and vitamin D intake and regular weightbearing exercise         Samantha Nix MD

## 2021-08-24 NOTE — PROGRESS NOTES
Assessment/Plan:   this 59-year-old female has been feeling well with the exception that occasionally she gets lightheaded with standing and she feels this may be from low blood pressures she has stopped her losartan 50 mg daily  Her blood pressure cuff is broken and she will buy new blood pressure  There are no blood pressures documented since this symptom occurred  She is known to benign positional vertigo the past   She will take losartan 50 mg of her blood pressures over 034 systolic otherwise she will take 25 mg which is a half a pill daily  Laboratory studies will be rechecked including her thyroid to see if her 50 mcg daily dose needs altering  Diet reviewed  Lifestyle modifications reviewed  Medications reviewed and ordered  Laboratory tests and studies reviewed and ordered  All patient's questions answered to patient satisfaction  Chief Complaint   Patient presents with    Medicare Wellness Visit         Diagnoses and all orders for this visit:    Medicare annual wellness visit, subsequent    Chronic venous stasis dermatitis    Benign paroxysmal positional vertigo due to bilateral vestibular disorder    Dizziness    Angina pectoris ([facility] [address] )    Acquired hypothyroidism    Other orders  -     POCT ECG  -     TSH, 3rd generation with Free T4 reflex; Future  -     CBC and differential; Future  -     Vitamin D 25 hydroxy; Future  -     Lipid Panel with Direct LDL reflex; Future  -     Comprehensive metabolic panel; Future        Subjective:     this 80year-old female has been feeling well with the exception that occasionally she gets lightheaded with standing and she feels this may be from low blood pressures she has stopped her losartan 50 mg daily  Her blood pressure cuff is broken and she will buy new blood pressure  There are no blood pressures documented since this symptom occurred    She is known to benign positional vertigo the past   She will take losartan 50 mg of her blood pressures over 150 systolic otherwise she will take 25 mg which is a half a pill daily  Laboratory studies will be rechecked including her thyroid to see if her 50 mcg daily dose needs altering  Patient ID: Batsheva Law is a 80 y o  female  Current Outpatient Medications:     aspirin (ASPIRIN LOW DOSE) 81 MG tablet, Take 1 tablet (81 mg total) by mouth daily, Disp: 100 tablet, Rfl: 4    Cholecalciferol (Vitamin D3) 50 MCG (2000 UT) TABS, Take 1 tablet (2,000 Units total) by mouth daily, Disp: 90 tablet, Rfl: 4    clopidogrel (PLAVIX) 75 mg tablet, Take 1 tablet (75 mg total) by mouth daily, Disp: 90 tablet, Rfl: 3    diltiazem (CARDIZEM CD) 240 mg 24 hr capsule, TAKE 1 CAPSULE(240 MG) BY MOUTH DAILY FOR 7 DAYS, Disp: 90 capsule, Rfl: 0    isosorbide mononitrate (IMDUR) 30 mg 24 hr tablet, TAKE 2 TABLETS(60 MG) BY MOUTH DAILY, Disp: 180 tablet, Rfl: 3    levothyroxine 50 mcg tablet, Take 1 tablet (50 mcg total) by mouth daily, Disp: 90 tablet, Rfl: 3    lidocaine (LIDODERM) 5 %, PLACE 1 PATCH ON THE SKIN DAILY FOR 7 DAYS  REMOVE AND DISCARD PATCH WITHIN 12 H (Patient not taking: Reported on 6/22/2021), Disp: , Rfl: 0    losartan (COZAAR) 50 mg tablet, Take 1 tablet (50 mg total) by mouth every 24 hours, Disp: 90 tablet, Rfl: 4    ranitidine (ZANTAC) 150 mg tablet, Take 1 tablet (150 mg total) by mouth daily as needed for heartburn (Patient not taking: Reported on 6/22/2021), Disp: 90 tablet, Rfl: 4    The following portions of the patient's history were reviewed and updated as appropriate: allergies, current medications, past family history, past medical history, past social history, past surgical history and problem list     Review of Systems   Constitutional: Negative for appetite change, fatigue, fever and unexpected weight change  HENT: Negative for rhinorrhea, sinus pressure, sinus pain, sneezing and sore throat  Eyes: Negative for visual disturbance     Respiratory: Negative for cough, chest tightness, shortness of breath and wheezing  Cardiovascular: Negative for chest pain, palpitations and leg swelling  Gastrointestinal: Negative for abdominal distention, abdominal pain, blood in stool, constipation, diarrhea, nausea and vomiting  Endocrine: Negative for polydipsia and polyuria  Genitourinary: Negative for decreased urine volume, difficulty urinating, dysuria, hematuria and urgency  Musculoskeletal: Negative for arthralgias, back pain, joint swelling and neck pain  Skin: Negative for rash  Allergic/Immunologic: Negative for environmental allergies  Neurological: Positive for dizziness and light-headedness  Negative for tremors, weakness, numbness and headaches  Hematological: Does not bruise/bleed easily  Psychiatric/Behavioral: Negative for agitation, behavioral problems, confusion and dysphoric mood  The patient is not nervous/anxious  Family History   Problem Relation Age of Onset    No Known Problems Mother     No Known Problems Father        Past Medical History:   Diagnosis Date    Appendicitis     CAD (coronary artery disease)     Colon polyps     Diverticulosis     Hypertension, accelerated 6/11/2016    Stage 3 chronic kidney disease, unspecified whether stage 3a or 3b CKD ([facility] [address] ) 6/22/2021       Past Surgical History:   Procedure Laterality Date    APPENDECTOMY      CARDIAC CATHETERIZATION  03/2009    CATARACT EXTRACTION      COLONOSCOPY W/ POLYPECTOMY  10/2012    HYSTERECTOMY      TONSILLECTOMY         Social History     Socioeconomic History    Marital status:       Spouse name: None    Number of children: None    Years of education: None    Highest education level: None   Occupational History    Occupation: Retired   Tobacco Use    Smoking status: Never Smoker    Smokeless tobacco: Never Used   Vaping Use    Vaping Use: Never used   Substance and Sexual Activity    Alcohol use: Yes     Comment: Occasional    Drug use: No    Sexual activity: Not Currently   Other Topics Concern    None   Social History Narrative    None     Social Determinants of Health     Financial Resource Strain:     Difficulty of Paying Living Expenses:    Food Insecurity:     Worried About Running Out of Food in the Last Year:     [address] in the Last Year:    Transportation Needs:     Lack of Transportation (Medical):  Lack of Transportation (Non-Medical):    Physical Activity:     Days of Exercise per Week:     Minutes of Exercise per Session:    Stress:     Feeling of Stress :    Social Connections:     Frequency of Communication with Friends and Family:     Frequency of Social Gatherings with Friends and Family:     Attends [employer] Services:     Active Member of Clubs or Organizations:     Attends Club or Organization Meetings:     Marital Status:    Intimate Partner Violence:     Fear of Current or Ex-Partner:     Emotionally Abused:     Physically Abused:     Sexually Abused: Allergies   Allergen Reactions    Amlodipine     Carvedilol     Digoxin     Losartan Edema    Nifedipine      Other reaction(s): Nausea and/or vomiting    Olmesartan     Other      Other reaction(s): Nausea and/or vomiting  CALCIUM CHANNEL BLOCKING AGENT    Rosuvastatin      Other reaction(s): Other (See Comments)  nausea    Sulfa Antibiotics             Objective:    /86 (BP Location: Left arm, Patient Position: Sitting, Cuff Size: Adult)   Pulse 62   Temp (!) 96 °F (35 6 °C)   Resp 18   Ht 4' 11" (1 499 m)   Wt 65 6 kg (144 lb 9 6 oz)   SpO2 98%   BMI 29 21 kg/m²        Physical Exam  Constitutional:       General: She is not in acute distress  Appearance: She is well-developed  HENT:      Head: Normocephalic and atraumatic  Nose: Nose normal       Mouth/Throat:      Pharynx: No oropharyngeal exudate  Eyes:      General: No scleral icterus       Conjunctiva/sclera: Conjunctivae normal       Pupils: Pupils are equal, round, and reactive to light  Neck:      Thyroid: No thyromegaly  Vascular: No JVD  Trachea: No tracheal deviation  Cardiovascular:      Rate and Rhythm: Normal rate and regular rhythm  Heart sounds: Normal heart sounds  No murmur heard  No friction rub  No gallop  Pulmonary:      Effort: Pulmonary effort is normal  No respiratory distress  Breath sounds: No wheezing or rales  Chest:      Chest wall: No tenderness  Abdominal:      General: Bowel sounds are normal  There is no distension  Palpations: Abdomen is soft  There is no mass  Tenderness: There is no abdominal tenderness  There is no guarding or rebound  Musculoskeletal:         General: No deformity  Normal range of motion  Cervical back: Normal range of motion and neck supple  Lymphadenopathy:      Cervical: No cervical adenopathy  Skin:     General: Skin is warm and dry  Findings: No rash  Neurological:      Mental Status: She is alert and oriented to person, place, and time  Cranial Nerves: No cranial nerve deficit  Coordination: Coordination normal    Psychiatric:         Behavior: Behavior normal          Thought Content:  Thought content normal          Judgment: Judgment normal

## 2021-09-28 DIAGNOSIS — K21.9 GASTROESOPHAGEAL REFLUX DISEASE WITHOUT ESOPHAGITIS: ICD-10-CM

## 2021-09-28 DIAGNOSIS — Z12.31 ENCOUNTER FOR SCREENING MAMMOGRAM FOR MALIGNANT NEOPLASM OF BREAST: ICD-10-CM

## 2021-09-28 DIAGNOSIS — Z00.00 MEDICARE ANNUAL WELLNESS VISIT, SUBSEQUENT: ICD-10-CM

## 2021-09-28 DIAGNOSIS — I10 ESSENTIAL HYPERTENSION: ICD-10-CM

## 2021-09-28 DIAGNOSIS — E03.9 HYPOTHYROIDISM, UNSPECIFIED TYPE: ICD-10-CM

## 2021-09-28 DIAGNOSIS — I25.10 CORONARY ARTERY DISEASE INVOLVING NATIVE CORONARY ARTERY OF NATIVE HEART WITHOUT ANGINA PECTORIS: ICD-10-CM

## 2021-09-28 RX ORDER — DILTIAZEM HYDROCHLORIDE 240 MG/1
CAPSULE, COATED, EXTENDED RELEASE ORAL
Qty: 90 CAPSULE | Refills: 0 | Status: SHIPPED | OUTPATIENT
Start: 2021-09-28 | End: 2021-09-29

## 2021-09-28 RX ORDER — ISOSORBIDE MONONITRATE 30 MG/1
TABLET, EXTENDED RELEASE ORAL
Qty: 180 TABLET | Refills: 3 | Status: SHIPPED | OUTPATIENT
Start: 2021-09-28

## 2021-09-29 DIAGNOSIS — I10 ESSENTIAL HYPERTENSION: ICD-10-CM

## 2021-09-29 DIAGNOSIS — E03.9 HYPOTHYROIDISM, UNSPECIFIED TYPE: ICD-10-CM

## 2021-09-29 DIAGNOSIS — Z00.00 MEDICARE ANNUAL WELLNESS VISIT, SUBSEQUENT: ICD-10-CM

## 2021-09-29 DIAGNOSIS — K21.9 GASTROESOPHAGEAL REFLUX DISEASE WITHOUT ESOPHAGITIS: ICD-10-CM

## 2021-09-29 DIAGNOSIS — Z12.31 ENCOUNTER FOR SCREENING MAMMOGRAM FOR MALIGNANT NEOPLASM OF BREAST: ICD-10-CM

## 2021-09-29 DIAGNOSIS — I25.10 CORONARY ARTERY DISEASE INVOLVING NATIVE CORONARY ARTERY OF NATIVE HEART WITHOUT ANGINA PECTORIS: ICD-10-CM

## 2021-09-29 RX ORDER — DILTIAZEM HYDROCHLORIDE 240 MG/1
CAPSULE, EXTENDED RELEASE ORAL
Qty: 90 CAPSULE | Refills: 0 | Status: SHIPPED | OUTPATIENT
Start: 2021-09-29 | End: 2022-03-01 | Stop reason: SDUPTHER

## 2021-11-21 DIAGNOSIS — K21.9 GASTROESOPHAGEAL REFLUX DISEASE WITHOUT ESOPHAGITIS: ICD-10-CM

## 2021-11-21 DIAGNOSIS — I10 ESSENTIAL HYPERTENSION: ICD-10-CM

## 2021-11-21 DIAGNOSIS — Z00.00 MEDICARE ANNUAL WELLNESS VISIT, SUBSEQUENT: ICD-10-CM

## 2021-11-21 DIAGNOSIS — I25.10 CORONARY ARTERY DISEASE INVOLVING NATIVE CORONARY ARTERY OF NATIVE HEART WITHOUT ANGINA PECTORIS: ICD-10-CM

## 2021-11-21 DIAGNOSIS — E03.9 HYPOTHYROIDISM, UNSPECIFIED TYPE: ICD-10-CM

## 2021-11-21 DIAGNOSIS — Z12.31 ENCOUNTER FOR SCREENING MAMMOGRAM FOR MALIGNANT NEOPLASM OF BREAST: ICD-10-CM

## 2021-11-22 RX ORDER — CLOPIDOGREL BISULFATE 75 MG/1
TABLET ORAL
Qty: 90 TABLET | Refills: 3 | Status: SHIPPED | OUTPATIENT
Start: 2021-11-22

## 2022-01-11 ENCOUNTER — OFFICE VISIT (OUTPATIENT)
Dept: FAMILY MEDICINE CLINIC | Facility: CLINIC | Age: 87
End: 2022-01-11
Payer: MEDICARE

## 2022-01-11 VITALS
TEMPERATURE: 98.3 F | OXYGEN SATURATION: 99 % | DIASTOLIC BLOOD PRESSURE: 100 MMHG | SYSTOLIC BLOOD PRESSURE: 160 MMHG | HEART RATE: 62 BPM | BODY MASS INDEX: 29.43 KG/M2 | RESPIRATION RATE: 16 BRPM | WEIGHT: 146 LBS | HEIGHT: 59 IN

## 2022-01-11 DIAGNOSIS — I10 WHITE COAT SYNDROME WITH DIAGNOSIS OF HYPERTENSION: ICD-10-CM

## 2022-01-11 DIAGNOSIS — R41.3 SHORT-TERM MEMORY LOSS: ICD-10-CM

## 2022-01-11 DIAGNOSIS — I48.0 PAROXYSMAL ATRIAL FIBRILLATION (HCC): ICD-10-CM

## 2022-01-11 DIAGNOSIS — N18.30 STAGE 3 CHRONIC KIDNEY DISEASE, UNSPECIFIED WHETHER STAGE 3A OR 3B CKD (HCC): ICD-10-CM

## 2022-01-11 DIAGNOSIS — I20.9 ANGINA PECTORIS (HCC): ICD-10-CM

## 2022-01-11 DIAGNOSIS — L57.0 ACTINIC KERATOSIS OF SCALP: Primary | ICD-10-CM

## 2022-01-11 DIAGNOSIS — E03.9 ACQUIRED HYPOTHYROIDISM: ICD-10-CM

## 2022-01-11 DIAGNOSIS — K55.9 ISCHEMIC BOWEL DISEASE (HCC): ICD-10-CM

## 2022-01-11 PROCEDURE — 99214 OFFICE O/P EST MOD 30 MIN: CPT | Performed by: INTERNAL MEDICINE

## 2022-01-11 NOTE — PROGRESS NOTES
Assessment/Plan: This 59-year-old female was noted to have lesion on her scalp by her hairstylist   This appears to be approximately 2 cm actinic keratosis  She will be referred to Dermatology for confirmation of diagnosis and possible treatment  Patient's blood pressure is elevated this visit  She has a medication continued her home meds that she takes her medicines reliably  She is not check her blood pressures at but is willing to do this and right on a paper if she remembers  Her sister-in-law and her son are her major helpers at home and visit her frequently and will help look after this  Patient has not had any symptomatic angina for a long time  Patient notes no symptoms of atrial fibrillation and continues her same medication routine  Sister-in-law notes the patient has worsening short-term memory loss and sometimes calls her in the morning thinking it is the wrong day and asking will what they are doing that day when they do not have any plans for that particular day  She functions well in her house and has no threatening and since such as falls  She will have laboratory work done including thyroid B12 folate chemistries and CBC  In the future she will be offered an MRI which she does not want at this time  A mini-mental status examination will be done  She is a candidate for a trial with Namenda although this would have to be could cautiously done since she lives alone  Diet reviewed  Lifestyle modifications reviewed  Medications reviewed and ordered  Laboratory tests and studies reviewed and ordered  All patient's questions answered to patient satisfaction  Chief Complaint   Patient presents with    Mass     patients josi states that patient has a lump on her head that is getting bigger     Memory Loss         Diagnoses and all orders for this visit:    Actinic keratosis of scalp  -     Ambulatory Referral to Dermatology;  Future    White coat syndrome with diagnosis of hypertension  Comments:  Check home blood pressures    Angina pectoris (HCC)    Stage 3 chronic kidney disease, unspecified whether stage 3a or 3b CKD (HCC)    Acquired hypothyroidism    Paroxysmal atrial fibrillation (HCC)    Short-term memory loss  Comments:  Needs lab testing MRI brain mini-mental status exam and consider cautious Namenda for early Alzheimer's  Orders:  -     Vitamin B12; Future  -     Folate; Future    Ischemic bowel disease (HCC)        Subjective:     HPI     Patient ID: Renzo Mayo is a 80 y o  female  Current Outpatient Medications:     aspirin (ASPIRIN LOW DOSE) 81 MG tablet, Take 1 tablet (81 mg total) by mouth daily, Disp: 100 tablet, Rfl: 4    Cartia  MG 24 hr capsule, TAKE 1 CAPSULE(240 MG) BY MOUTH DAILY, Disp: 90 capsule, Rfl: 0    Cholecalciferol (Vitamin D3) 50 MCG (2000 UT) TABS, Take 1 tablet (2,000 Units total) by mouth daily, Disp: 90 tablet, Rfl: 4    clopidogrel (PLAVIX) 75 mg tablet, TAKE 1 TABLET(75 MG) BY MOUTH DAILY, Disp: 90 tablet, Rfl: 3    isosorbide mononitrate (IMDUR) 30 mg 24 hr tablet, TAKE 2 TABLETS(60 MG) BY MOUTH DAILY, Disp: 180 tablet, Rfl: 3    levothyroxine 50 mcg tablet, Take 1 tablet (50 mcg total) by mouth daily, Disp: 90 tablet, Rfl: 3    lidocaine (LIDODERM) 5 %, PLACE 1 PATCH ON THE SKIN DAILY FOR 7 DAYS   REMOVE AND DISCARD PATCH WITHIN 12 H (Patient not taking: Reported on 6/22/2021), Disp: , Rfl: 0    losartan (COZAAR) 50 mg tablet, TAKE 1 TABLET(50 MG) BY MOUTH EVERY 24 HOURS, Disp: 90 tablet, Rfl: 2    ranitidine (ZANTAC) 150 mg tablet, Take 1 tablet (150 mg total) by mouth daily as needed for heartburn (Patient not taking: Reported on 6/22/2021), Disp: 90 tablet, Rfl: 4    triamcinolone (KENALOG) 0 5 % cream, Apply topically 3 (three) times a day, Disp: 454 g, Rfl: 3    The following portions of the patient's history were reviewed and updated as appropriate: allergies, current medications, past family history, past medical history, past social history, past surgical history and problem list     Review of Systems   Constitutional: Negative for appetite change, fatigue, fever and unexpected weight change  HENT: Negative for rhinorrhea, sinus pressure, sinus pain, sneezing and sore throat  Eyes: Negative for visual disturbance  Respiratory: Negative for cough, chest tightness, shortness of breath and wheezing  Cardiovascular: Negative for chest pain, palpitations and leg swelling  Gastrointestinal: Negative for abdominal distention, abdominal pain, blood in stool, constipation, diarrhea, nausea and vomiting  Endocrine: Negative for polydipsia and polyuria  Genitourinary: Negative for decreased urine volume, difficulty urinating, dysuria, hematuria and urgency  Musculoskeletal: Negative for arthralgias, back pain, joint swelling and neck pain  Skin: Negative for rash  Allergic/Immunologic: Negative for environmental allergies  Neurological: Negative for tremors, weakness, light-headedness, numbness and headaches  Hematological: Does not bruise/bleed easily  Psychiatric/Behavioral: Negative for agitation, behavioral problems, confusion and dysphoric mood  The patient is not nervous/anxious  Family History   Problem Relation Age of Onset    No Known Problems Mother     No Known Problems Father        Past Medical History:   Diagnosis Date    Appendicitis     CAD (coronary artery disease)     Colon polyps     Diverticulosis     Hypertension, accelerated 6/11/2016    Stage 3 chronic kidney disease, unspecified whether stage 3a or 3b CKD ([facility] [address] ) 6/22/2021       Past Surgical History:   Procedure Laterality Date    APPENDECTOMY      CARDIAC CATHETERIZATION  03/2009    CATARACT EXTRACTION      COLONOSCOPY W/ POLYPECTOMY  10/2012    HYSTERECTOMY      TONSILLECTOMY         Social History     Socioeconomic History    Marital status:       Spouse name: None    Number of children: None  Years of education: None    Highest education level: None   Occupational History    Occupation: Retired   Tobacco Use    Smoking status: Never Smoker    Smokeless tobacco: Never Used   Vaping Use    Vaping Use: Never used   Substance and Sexual Activity    Alcohol use: Yes     Comment: Occasional    Drug use: No    Sexual activity: Not Currently   Other Topics Concern    None   Social History Narrative    None     Social Determinants of Health     Financial Resource Strain: Not on file   Food Insecurity: Not on file   Transportation Needs: Not on file   Physical Activity: Not on file   Stress: Not on file   Social Connections: Not on file   Intimate Partner Violence: Not on file   Housing Stability: Not on file       Allergies   Allergen Reactions    Amlodipine     Carvedilol     Digoxin     Losartan Edema    Nifedipine      Other reaction(s): Nausea and/or vomiting    Olmesartan     Other      Other reaction(s): Nausea and/or vomiting  CALCIUM CHANNEL BLOCKING AGENT    Rosuvastatin      Other reaction(s): Other (See Comments)  nausea    Sulfa Antibiotics        BMI Counseling: Body mass index is 29 49 kg/m²  The BMI is above normal  Nutrition recommendations include decreasing portion sizes and moderation in carbohydrate intake  Patient referred to PCP  Rationale for BMI follow-up plan is due to patient being overweight or obese  BMI Counseling: Body mass index is 29 49 kg/m²  Follow-up plan was not completed due to elderly patient (72 years old) where weight reduction/weight gain would complicate underlying health condition such as: illness or physical disability  Depression Screening and Follow-up Plan: Patient was screened for depression during today's encounter  They screened negative with a PHQ-2 score of 0          Objective:    /100 (BP Location: Left arm, Patient Position: Sitting, Cuff Size: Standard)   Pulse 62   Temp 98 3 °F (36 8 °C)   Resp 16   Ht 4' 11" (1 499 m) Wt 66 2 kg (146 lb)   SpO2 99%   BMI 29 49 kg/m²        Physical Exam  Constitutional:       General: She is not in acute distress  Appearance: She is well-developed  HENT:      Head: Normocephalic and atraumatic  Nose: Nose normal       Mouth/Throat:      Pharynx: No oropharyngeal exudate  Eyes:      General: No scleral icterus  Conjunctiva/sclera: Conjunctivae normal       Pupils: Pupils are equal, round, and reactive to light  Neck:      Thyroid: No thyromegaly  Vascular: No JVD  Trachea: No tracheal deviation  Cardiovascular:      Rate and Rhythm: Normal rate and regular rhythm  Heart sounds: Normal heart sounds  No murmur heard  No friction rub  No gallop  Pulmonary:      Effort: Pulmonary effort is normal  No respiratory distress  Breath sounds: No wheezing or rales  Chest:      Chest wall: No tenderness  Abdominal:      General: Bowel sounds are normal  There is no distension  Palpations: Abdomen is soft  There is no mass  Tenderness: There is no abdominal tenderness  There is no guarding or rebound  Musculoskeletal:         General: No deformity  Normal range of motion  Cervical back: Normal range of motion and neck supple  Lymphadenopathy:      Cervical: No cervical adenopathy  Skin:     General: Skin is warm and dry  Findings: No rash  Neurological:      Mental Status: She is alert and oriented to person, place, and time  Cranial Nerves: No cranial nerve deficit  Coordination: Coordination normal    Psychiatric:         Behavior: Behavior normal          Thought Content:  Thought content normal          Judgment: Judgment normal

## 2022-02-24 ENCOUNTER — OFFICE VISIT (OUTPATIENT)
Dept: FAMILY MEDICINE CLINIC | Facility: CLINIC | Age: 87
End: 2022-02-24
Payer: MEDICARE

## 2022-02-24 VITALS
OXYGEN SATURATION: 98 % | DIASTOLIC BLOOD PRESSURE: 78 MMHG | RESPIRATION RATE: 14 BRPM | TEMPERATURE: 98.2 F | WEIGHT: 146.6 LBS | SYSTOLIC BLOOD PRESSURE: 162 MMHG | HEART RATE: 54 BPM | HEIGHT: 59 IN | BODY MASS INDEX: 29.56 KG/M2

## 2022-02-24 DIAGNOSIS — E03.9 ACQUIRED HYPOTHYROIDISM: ICD-10-CM

## 2022-02-24 DIAGNOSIS — E43 EDEMA DUE TO MALNUTRITION, DUE TO UNSPECIFIED MALNUTRITION TYPE (HCC): ICD-10-CM

## 2022-02-24 DIAGNOSIS — I45.10 INCOMPLETE RIGHT BUNDLE BRANCH BLOCK: ICD-10-CM

## 2022-02-24 DIAGNOSIS — I20.9 ANGINA PECTORIS (HCC): ICD-10-CM

## 2022-02-24 DIAGNOSIS — D51.8 VITAMIN B12 DEFICIENCY (DIETARY) ANEMIA: ICD-10-CM

## 2022-02-24 DIAGNOSIS — H91.93 BILATERAL HEARING LOSS, UNSPECIFIED HEARING LOSS TYPE: ICD-10-CM

## 2022-02-24 DIAGNOSIS — R00.1 BRADYCARDIA BY ELECTROCARDIOGRAM: ICD-10-CM

## 2022-02-24 DIAGNOSIS — I48.0 PAROXYSMAL ATRIAL FIBRILLATION (HCC): Primary | ICD-10-CM

## 2022-02-24 DIAGNOSIS — E55.9 VITAMIN D DEFICIENCY: ICD-10-CM

## 2022-02-24 DIAGNOSIS — H81.13 BENIGN PAROXYSMAL POSITIONAL VERTIGO DUE TO BILATERAL VESTIBULAR DISORDER: ICD-10-CM

## 2022-02-24 DIAGNOSIS — I10 HYPERTENSION, ACCELERATED: ICD-10-CM

## 2022-02-24 PROCEDURE — 93000 ELECTROCARDIOGRAM COMPLETE: CPT | Performed by: INTERNAL MEDICINE

## 2022-02-24 PROCEDURE — 99214 OFFICE O/P EST MOD 30 MIN: CPT | Performed by: INTERNAL MEDICINE

## 2022-02-24 NOTE — PROGRESS NOTES
Assessment/Plan: This 12-year-old female with long history of hypertension with a white coat component notes that her blood pressures are usually not as high at home low was being 950 systolic up to 114 and controlled in this range she is very careful about her salt intake and takes her medication faithfully  She has a history of angina pectoris and takes nitroglycerin 1 time per month for chest discomfort which is well controlled  She has acquired hypothyroidism which is well controlled    EKG demonstrates incomplete right bundle-branch block and normal sinus rhythm with sinus bradycardia  Apparently her paroxysmal atrial fibrillation was in the remote past and she is controlled in sinus rhythm on the current medications    Patient has a history of bilateral hearing loss which is worse in the left ear and requests a full hearing evaluation  Vitamin-D level is 18 on a 1000 units daily and she will increase to 2000 units daily    Vitamin B12 level is 353  She used to be on vitamin B12 and will start again taking it at 100 mcg daily with a methylmalonic acid level checked before the next labs so let us  Diet reviewed  Lifestyle modifications reviewed  Medications reviewed and ordered  Laboratory tests and studies reviewed and ordered  All patient's questions answered to patient satisfaction  Chief Complaint   Patient presents with    Bilateral carotid artery disease     last labs 2 15 22; pt also concern about hearing in left ear    Paroxysmal atrial fibrillation    Hypertension, accelerated         Diagnoses and all orders for this visit:    Paroxysmal atrial fibrillation ([facility] [address] )  Comments:  Sinus bradycardia 49 with incomplete right bundle-branch block on EKG  Paroxysmal atrial fibrillation was remote  Continue aspirin and other meds  Orders:  -     POCT ECG    Incomplete right bundle branch block    Acquired hypothyroidism    Angina pectoris ([address] )  Comments:  Nitroglycerin once a month      Benign paroxysmal positional vertigo due to bilateral vestibular disorder    Bradycardia by electrocardiogram    Edema due to malnutrition, due to unspecified malnutrition type (HCC)    Hypertension, accelerated    Bilateral hearing loss, unspecified hearing loss type  -     Ambulatory Referral to Otolaryngology; Future        Subjective: This 77-year-old female with long history of hypertension with a white coat component notes that her blood pressures are usually not as high at home low was being 491 systolic up to 322 and controlled in this range she is very careful about her salt intake and takes her medication faithfully  She has a history of angina pectoris and takes nitroglycerin 1 time per month for chest discomfort which is well controlled  She has acquired hypothyroidism which is well controlled    EKG demonstrates incomplete right bundle-branch block and normal sinus rhythm with sinus bradycardia  Apparently her paroxysmal atrial fibrillation was in the remote past and she is controlled in sinus rhythm on the current medications    Patient has a history of bilateral hearing loss which is worse in the left ear and requests a full hearing evaluation  Patient ID: Melo Hickman is a 80 y o  female          Current Outpatient Medications:     aspirin (ASPIRIN LOW DOSE) 81 MG tablet, Take 1 tablet (81 mg total) by mouth daily, Disp: 100 tablet, Rfl: 4    Cartia  MG 24 hr capsule, TAKE 1 CAPSULE(240 MG) BY MOUTH DAILY, Disp: 90 capsule, Rfl: 0    Cholecalciferol (Vitamin D3) 50 MCG (2000 UT) TABS, Take 1 tablet (2,000 Units total) by mouth daily, Disp: 90 tablet, Rfl: 4    clopidogrel (PLAVIX) 75 mg tablet, TAKE 1 TABLET(75 MG) BY MOUTH DAILY, Disp: 90 tablet, Rfl: 3    isosorbide mononitrate (IMDUR) 30 mg 24 hr tablet, TAKE 2 TABLETS(60 MG) BY MOUTH DAILY, Disp: 180 tablet, Rfl: 3    levothyroxine 50 mcg tablet, Take 1 tablet (50 mcg total) by mouth daily, Disp: 90 tablet, Rfl: 3   losartan (COZAAR) 50 mg tablet, TAKE 1 TABLET(50 MG) BY MOUTH EVERY 24 HOURS, Disp: 90 tablet, Rfl: 2    triamcinolone (KENALOG) 0 5 % cream, Apply topically 3 (three) times a day, Disp: 454 g, Rfl: 3    lidocaine (LIDODERM) 5 %, PLACE 1 PATCH ON THE SKIN DAILY FOR 7 DAYS  REMOVE AND DISCARD PATCH WITHIN 12 H (Patient not taking: Reported on 6/22/2021), Disp: , Rfl: 0    ranitidine (ZANTAC) 150 mg tablet, Take 1 tablet (150 mg total) by mouth daily as needed for heartburn (Patient not taking: Reported on 6/22/2021), Disp: 90 tablet, Rfl: 4    The following portions of the patient's history were reviewed and updated as appropriate: allergies, current medications, past family history, past medical history, past social history, past surgical history and problem list     Review of Systems   Constitutional: Negative for appetite change, fatigue, fever and unexpected weight change  HENT: Negative for rhinorrhea, sinus pressure, sinus pain, sneezing and sore throat  Eyes: Negative for visual disturbance  Respiratory: Negative for cough, chest tightness, shortness of breath and wheezing  Cardiovascular: Negative for chest pain, palpitations and leg swelling  Gastrointestinal: Negative for abdominal distention, abdominal pain, blood in stool, constipation, diarrhea, nausea and vomiting  Endocrine: Negative for polydipsia and polyuria  Genitourinary: Negative for decreased urine volume, difficulty urinating, dysuria, hematuria and urgency  Musculoskeletal: Negative for arthralgias, back pain, joint swelling and neck pain  Skin: Negative for rash  Allergic/Immunologic: Negative for environmental allergies  Neurological: Negative for tremors, weakness, light-headedness, numbness and headaches  Hematological: Does not bruise/bleed easily  Psychiatric/Behavioral: Negative for agitation, behavioral problems, confusion and dysphoric mood  The patient is not nervous/anxious            Family History Problem Relation Age of Onset    No Known Problems Mother     No Known Problems Father        Past Medical History:   Diagnosis Date    Appendicitis     CAD (coronary artery disease)     Colon polyps     Diverticulosis     Hypertension, accelerated 6/11/2016    Stage 3 chronic kidney disease, unspecified whether stage 3a or 3b CKD ([facility] [address] ) 6/22/2021       Past Surgical History:   Procedure Laterality Date    APPENDECTOMY      CARDIAC CATHETERIZATION  03/2009    CATARACT EXTRACTION      COLONOSCOPY W/ POLYPECTOMY  10/2012    HYSTERECTOMY      TONSILLECTOMY         Social History     Socioeconomic History    Marital status:      Spouse name: None    Number of children: None    Years of education: None    Highest education level: None   Occupational History    Occupation: Retired   Tobacco Use    Smoking status: Never Smoker    Smokeless tobacco: Never Used   Vaping Use    Vaping Use: Never used   Substance and Sexual Activity    Alcohol use: Yes     Comment: Occasional    Drug use: No    Sexual activity: Not Currently   Other Topics Concern    None   Social History Narrative    None     Social Determinants of Health     Financial Resource Strain: Not on file   Food Insecurity: Not on file   Transportation Needs: Not on file   Physical Activity: Not on file   Stress: Not on file   Social Connections: Not on file   Intimate Partner Violence: Not on file   Housing Stability: Not on file       Allergies   Allergen Reactions    Amlodipine     Carvedilol     Digoxin     Losartan Edema    Nifedipine      Other reaction(s): Nausea and/or vomiting    Olmesartan     Other      Other reaction(s): Nausea and/or vomiting  CALCIUM CHANNEL BLOCKING AGENT    Rosuvastatin      Other reaction(s): Other (See Comments)  nausea    Sulfa Antibiotics          Depression Screening and Follow-up Plan: Patient was screened for depression during today's encounter   They screened negative with a PHQ-2 score of 0  Objective:    /78 (BP Location: Left arm, Patient Position: Sitting, Cuff Size: Adult)   Pulse (!) 54   Temp 98 2 °F (36 8 °C) (Tympanic)   Resp 14   Ht 4' 11" (1 499 m)   Wt 66 5 kg (146 lb 9 6 oz)   SpO2 98%   BMI 29 61 kg/m²        Physical Exam  Constitutional:       General: She is not in acute distress  Appearance: She is well-developed  HENT:      Head: Normocephalic and atraumatic  Nose: Nose normal       Mouth/Throat:      Pharynx: No oropharyngeal exudate  Eyes:      General: No scleral icterus  Conjunctiva/sclera: Conjunctivae normal       Pupils: Pupils are equal, round, and reactive to light  Neck:      Thyroid: No thyromegaly  Vascular: No JVD  Trachea: No tracheal deviation  Cardiovascular:      Rate and Rhythm: Normal rate and regular rhythm  Heart sounds: Normal heart sounds  No murmur heard  No friction rub  No gallop  Pulmonary:      Effort: Pulmonary effort is normal  No respiratory distress  Breath sounds: No wheezing or rales  Chest:      Chest wall: No tenderness  Abdominal:      General: Bowel sounds are normal  There is no distension  Palpations: Abdomen is soft  There is no mass  Tenderness: There is no abdominal tenderness  There is no guarding or rebound  Musculoskeletal:         General: No deformity  Normal range of motion  Cervical back: Normal range of motion and neck supple  Lymphadenopathy:      Cervical: No cervical adenopathy  Skin:     General: Skin is warm and dry  Findings: No rash  Neurological:      Mental Status: She is alert and oriented to person, place, and time  Cranial Nerves: No cranial nerve deficit  Coordination: Coordination normal    Psychiatric:         Behavior: Behavior normal          Thought Content:  Thought content normal          Judgment: Judgment normal

## 2022-03-01 DIAGNOSIS — Z12.31 ENCOUNTER FOR SCREENING MAMMOGRAM FOR MALIGNANT NEOPLASM OF BREAST: ICD-10-CM

## 2022-03-01 DIAGNOSIS — K21.9 GASTROESOPHAGEAL REFLUX DISEASE WITHOUT ESOPHAGITIS: ICD-10-CM

## 2022-03-01 DIAGNOSIS — Z00.00 MEDICARE ANNUAL WELLNESS VISIT, SUBSEQUENT: ICD-10-CM

## 2022-03-01 DIAGNOSIS — I10 ESSENTIAL HYPERTENSION: ICD-10-CM

## 2022-03-01 DIAGNOSIS — I25.10 CORONARY ARTERY DISEASE INVOLVING NATIVE CORONARY ARTERY OF NATIVE HEART WITHOUT ANGINA PECTORIS: ICD-10-CM

## 2022-03-01 DIAGNOSIS — E03.9 HYPOTHYROIDISM, UNSPECIFIED TYPE: ICD-10-CM

## 2022-03-02 RX ORDER — DILTIAZEM HYDROCHLORIDE 240 MG/1
240 CAPSULE, COATED, EXTENDED RELEASE ORAL DAILY
Qty: 90 CAPSULE | Refills: 0 | Status: SHIPPED | OUTPATIENT
Start: 2022-03-02

## 2022-05-12 ENCOUNTER — TELEPHONE (OUTPATIENT)
Dept: FAMILY MEDICINE CLINIC | Facility: CLINIC | Age: 87
End: 2022-05-12

## 2022-05-12 NOTE — TELEPHONE ENCOUNTER
Call from [address] at [facility] states that pt's blood pressure has been elevated last reading was 190/74  Her BP is good in the morning after taking her medication and more towards the evening it becomes elevated  Pt is to to be discharged from them sometime next week  Pt is scheduled to see Elda Madsen 5/26/22  Paula Kirk can be reached at [phone]

## 2022-05-12 NOTE — TELEPHONE ENCOUNTER
Change amlodipine to 5mg in afternoon and 5mg hs  If bp remains elevated at night or before am meds can increase to 5mg in afternoon and 10mg hs daily  Order bmp

## 2023-08-15 ENCOUNTER — APPOINTMENT (OUTPATIENT)
Dept: RADIOLOGY | Facility: MEDICAL CENTER | Age: 88
End: 2023-08-15
Payer: MEDICARE

## 2023-08-15 DIAGNOSIS — S82.891A FRACTURE DISLOCATION OF RIGHT ANKLE JOINT, CLOSED, INITIAL ENCOUNTER: ICD-10-CM

## 2023-08-15 PROCEDURE — 73610 X-RAY EXAM OF ANKLE: CPT
